# Patient Record
Sex: MALE | Race: WHITE | URBAN - METROPOLITAN AREA
[De-identification: names, ages, dates, MRNs, and addresses within clinical notes are randomized per-mention and may not be internally consistent; named-entity substitution may affect disease eponyms.]

---

## 2017-10-18 ENCOUNTER — OUTPATIENT (OUTPATIENT)
Dept: OUTPATIENT SERVICES | Facility: HOSPITAL | Age: 67
LOS: 1 days | Discharge: HOME | End: 2017-10-18
Payer: MEDICARE

## 2017-10-18 VITALS
OXYGEN SATURATION: 98 % | HEIGHT: 75 IN | TEMPERATURE: 98 F | RESPIRATION RATE: 18 BRPM | DIASTOLIC BLOOD PRESSURE: 67 MMHG | SYSTOLIC BLOOD PRESSURE: 120 MMHG | HEART RATE: 62 BPM | WEIGHT: 315 LBS

## 2017-10-18 DIAGNOSIS — M17.11 UNILATERAL PRIMARY OSTEOARTHRITIS, RIGHT KNEE: ICD-10-CM

## 2017-10-18 DIAGNOSIS — Z90.89 ACQUIRED ABSENCE OF OTHER ORGANS: Chronic | ICD-10-CM

## 2017-10-18 DIAGNOSIS — Z98.890 OTHER SPECIFIED POSTPROCEDURAL STATES: Chronic | ICD-10-CM

## 2017-10-18 LAB
ABO RH CONFIRMATION: SIGNIFICANT CHANGE UP
ANION GAP SERPL CALC-SCNC: 5 MMOL/L — SIGNIFICANT CHANGE UP (ref 5–17)
APPEARANCE UR: CLEAR — SIGNIFICANT CHANGE UP
BASOPHILS # BLD AUTO: 0.2 K/UL — SIGNIFICANT CHANGE UP (ref 0–0.2)
BASOPHILS NFR BLD AUTO: 1.2 % — SIGNIFICANT CHANGE UP (ref 0–2)
BILIRUB UR-MCNC: NEGATIVE — SIGNIFICANT CHANGE UP
BLD GP AB SCN SERPL QL: SIGNIFICANT CHANGE UP
BUN SERPL-MCNC: 17 MG/DL — SIGNIFICANT CHANGE UP (ref 7–23)
CALCIUM SERPL-MCNC: 8.8 MG/DL — SIGNIFICANT CHANGE UP (ref 8.5–10.1)
CHLORIDE SERPL-SCNC: 102 MMOL/L — SIGNIFICANT CHANGE UP (ref 96–108)
CO2 SERPL-SCNC: 29 MMOL/L — SIGNIFICANT CHANGE UP (ref 22–31)
COLOR SPEC: YELLOW — SIGNIFICANT CHANGE UP
CREAT SERPL-MCNC: 1 MG/DL — SIGNIFICANT CHANGE UP (ref 0.5–1.3)
DIFF PNL FLD: (no result)
EOSINOPHIL # BLD AUTO: 0.5 K/UL — SIGNIFICANT CHANGE UP (ref 0–0.5)
EOSINOPHIL NFR BLD AUTO: 3.6 % — SIGNIFICANT CHANGE UP (ref 0–6)
EPI CELLS # UR: SIGNIFICANT CHANGE UP
GLUCOSE SERPL-MCNC: 87 MG/DL — SIGNIFICANT CHANGE UP (ref 70–99)
GLUCOSE UR QL: NEGATIVE MG/DL — SIGNIFICANT CHANGE UP
HCT VFR BLD CALC: 44.1 % — SIGNIFICANT CHANGE UP (ref 39–50)
HGB BLD-MCNC: 15.5 G/DL — SIGNIFICANT CHANGE UP (ref 13–17)
KETONES UR-MCNC: NEGATIVE — SIGNIFICANT CHANGE UP
LEUKOCYTE ESTERASE UR-ACNC: NEGATIVE — SIGNIFICANT CHANGE UP
LYMPHOCYTES # BLD AUTO: 15.4 % — SIGNIFICANT CHANGE UP (ref 13–44)
LYMPHOCYTES # BLD AUTO: 2.1 K/UL — SIGNIFICANT CHANGE UP (ref 1–3.3)
MCHC RBC-ENTMCNC: 31.1 PG — SIGNIFICANT CHANGE UP (ref 27–34)
MCHC RBC-ENTMCNC: 35.2 GM/DL — SIGNIFICANT CHANGE UP (ref 32–36)
MCV RBC AUTO: 88.2 FL — SIGNIFICANT CHANGE UP (ref 80–100)
MONOCYTES # BLD AUTO: 1.3 K/UL — HIGH (ref 0–0.9)
MONOCYTES NFR BLD AUTO: 9.1 % — SIGNIFICANT CHANGE UP (ref 2–14)
MRSA PCR RESULT.: SIGNIFICANT CHANGE UP
NEUTROPHILS # BLD AUTO: 9.8 K/UL — HIGH (ref 1.8–7.4)
NEUTROPHILS NFR BLD AUTO: 70.8 % — SIGNIFICANT CHANGE UP (ref 43–77)
NITRITE UR-MCNC: NEGATIVE — SIGNIFICANT CHANGE UP
PH UR: 6.5 — SIGNIFICANT CHANGE UP (ref 5–8)
PLATELET # BLD AUTO: 294 K/UL — SIGNIFICANT CHANGE UP (ref 150–400)
POTASSIUM SERPL-MCNC: 4.2 MMOL/L — SIGNIFICANT CHANGE UP (ref 3.5–5.3)
POTASSIUM SERPL-SCNC: 4.2 MMOL/L — SIGNIFICANT CHANGE UP (ref 3.5–5.3)
PROT UR-MCNC: NEGATIVE MG/DL — SIGNIFICANT CHANGE UP
RBC # BLD: 5 M/UL — SIGNIFICANT CHANGE UP (ref 4.2–5.8)
RBC # FLD: 11.6 % — SIGNIFICANT CHANGE UP (ref 10.3–14.5)
RBC CASTS # UR COMP ASSIST: SIGNIFICANT CHANGE UP /HPF (ref 0–4)
S AUREUS DNA NOSE QL NAA+PROBE: SIGNIFICANT CHANGE UP
SODIUM SERPL-SCNC: 136 MMOL/L — SIGNIFICANT CHANGE UP (ref 135–145)
SP GR SPEC: 1.01 — SIGNIFICANT CHANGE UP (ref 1.01–1.02)
TYPE + AB SCN PNL BLD: SIGNIFICANT CHANGE UP
UROBILINOGEN FLD QL: NEGATIVE MG/DL — SIGNIFICANT CHANGE UP
WBC # BLD: 13.9 K/UL — HIGH (ref 3.8–10.5)
WBC # FLD AUTO: 13.9 K/UL — HIGH (ref 3.8–10.5)
WBC UR QL: NEGATIVE — SIGNIFICANT CHANGE UP

## 2017-10-18 PROCEDURE — 71020: CPT | Mod: 26

## 2017-10-18 PROCEDURE — 73562 X-RAY EXAM OF KNEE 3: CPT | Mod: 26,RT

## 2017-10-18 NOTE — H&P PST ADULT - TEACHING/LEARNING LEARNING PREFERENCES
skill demonstration/video/written material/individual instruction/audio/group instruction/verbal instruction

## 2017-10-18 NOTE — H&P PST ADULT - HISTORY OF PRESENT ILLNESS
67 years old male with Osteoarthritis of right knee.  Admits to pain with weight bearing to right knee "for a couple of years. Swelling at times to right knee. Admits to falling  10/13/2017. Planned knee replacement.

## 2017-10-18 NOTE — H&P PST ADULT - ASSESSMENT
67 years old male present to PST prior to Right Knee Replacement with Dr. Baldemar Ortiz III. Caprini Risk Model score of 9.  Plan   1. NPO after midnight  2. Take the following medications with sips of water on the day of procedure: Metoprolol  3. Use E-Z sponge as directed  4. Use Mupirocin as directed.  5. Drink a quart of extra  fluids the day before your surgery.  6 Medical clearance with Dr. Mauricio Mock and cardiac clearance with Dr. Ya.  7. CBC, BMP, Urinalysis, Type and Screen, MRSA sent to lab  8. EKG, right knee x-ray and chest x-ray done  9. PT/PTT and INR on the day od surgery

## 2017-10-18 NOTE — H&P PST ADULT - FAMILY HISTORY
Father  Still living? No  Family history of lung cancer, Age at diagnosis: Age Unknown     Mother  Still living? No  Family history of bone cancer, Age at diagnosis: Age Unknown
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
## 2017-10-25 ENCOUNTER — OUTPATIENT (OUTPATIENT)
Dept: OUTPATIENT SERVICES | Facility: HOSPITAL | Age: 67
LOS: 1 days | Discharge: HOME | End: 2017-10-25
Payer: MEDICARE

## 2017-10-25 DIAGNOSIS — I10 ESSENTIAL (PRIMARY) HYPERTENSION: ICD-10-CM

## 2017-10-25 DIAGNOSIS — Z01.818 ENCOUNTER FOR OTHER PREPROCEDURAL EXAMINATION: ICD-10-CM

## 2017-10-25 DIAGNOSIS — E78.5 HYPERLIPIDEMIA, UNSPECIFIED: ICD-10-CM

## 2017-10-25 DIAGNOSIS — E66.9 OBESITY, UNSPECIFIED: ICD-10-CM

## 2017-10-25 DIAGNOSIS — M17.11 UNILATERAL PRIMARY OSTEOARTHRITIS, RIGHT KNEE: ICD-10-CM

## 2017-10-25 DIAGNOSIS — Z98.890 OTHER SPECIFIED POSTPROCEDURAL STATES: Chronic | ICD-10-CM

## 2017-10-25 DIAGNOSIS — Z90.89 ACQUIRED ABSENCE OF OTHER ORGANS: Chronic | ICD-10-CM

## 2017-10-25 PROCEDURE — 93010 ELECTROCARDIOGRAM REPORT: CPT

## 2017-10-30 RX ORDER — OXYCODONE HYDROCHLORIDE 5 MG/1
10 TABLET ORAL EVERY 6 HOURS
Qty: 0 | Refills: 0 | Status: DISCONTINUED | OUTPATIENT
Start: 2017-10-31 | End: 2017-11-02

## 2017-10-30 RX ORDER — CELECOXIB 200 MG/1
200 CAPSULE ORAL
Qty: 0 | Refills: 0 | Status: DISCONTINUED | OUTPATIENT
Start: 2017-10-31 | End: 2017-11-02

## 2017-10-30 RX ORDER — SENNA PLUS 8.6 MG/1
2 TABLET ORAL AT BEDTIME
Qty: 0 | Refills: 0 | Status: DISCONTINUED | OUTPATIENT
Start: 2017-10-31 | End: 2017-11-02

## 2017-10-30 RX ORDER — PANTOPRAZOLE SODIUM 20 MG/1
40 TABLET, DELAYED RELEASE ORAL ONCE
Qty: 0 | Refills: 0 | Status: COMPLETED | OUTPATIENT
Start: 2017-10-31 | End: 2017-10-31

## 2017-10-30 RX ORDER — SODIUM CHLORIDE 9 MG/ML
3 INJECTION INTRAMUSCULAR; INTRAVENOUS; SUBCUTANEOUS EVERY 8 HOURS
Qty: 0 | Refills: 0 | Status: DISCONTINUED | OUTPATIENT
Start: 2017-10-31 | End: 2017-10-31

## 2017-10-30 RX ORDER — ONDANSETRON 8 MG/1
4 TABLET, FILM COATED ORAL EVERY 6 HOURS
Qty: 0 | Refills: 0 | Status: DISCONTINUED | OUTPATIENT
Start: 2017-10-31 | End: 2017-11-02

## 2017-10-30 RX ORDER — OXYCODONE HYDROCHLORIDE 5 MG/1
20 TABLET ORAL ONCE
Qty: 0 | Refills: 0 | Status: DISCONTINUED | OUTPATIENT
Start: 2017-10-31 | End: 2017-10-31

## 2017-10-30 RX ORDER — FAMOTIDINE 10 MG/ML
20 INJECTION INTRAVENOUS ONCE
Qty: 0 | Refills: 0 | Status: COMPLETED | OUTPATIENT
Start: 2017-10-31 | End: 2017-10-31

## 2017-10-30 RX ORDER — ACETAMINOPHEN 500 MG
650 TABLET ORAL EVERY 6 HOURS
Qty: 0 | Refills: 0 | Status: DISCONTINUED | OUTPATIENT
Start: 2017-10-31 | End: 2017-11-02

## 2017-10-30 RX ORDER — OXYCODONE HYDROCHLORIDE 5 MG/1
10 TABLET ORAL EVERY 12 HOURS
Qty: 0 | Refills: 0 | Status: DISCONTINUED | OUTPATIENT
Start: 2017-10-31 | End: 2017-11-02

## 2017-10-30 RX ORDER — ACETAMINOPHEN 500 MG
650 TABLET ORAL ONCE
Qty: 0 | Refills: 0 | Status: COMPLETED | OUTPATIENT
Start: 2017-10-31 | End: 2017-10-31

## 2017-10-30 RX ORDER — OXYCODONE HYDROCHLORIDE 5 MG/1
5 TABLET ORAL EVERY 6 HOURS
Qty: 0 | Refills: 0 | Status: DISCONTINUED | OUTPATIENT
Start: 2017-10-31 | End: 2017-11-02

## 2017-10-30 RX ORDER — HYDROMORPHONE HYDROCHLORIDE 2 MG/ML
0.5 INJECTION INTRAMUSCULAR; INTRAVENOUS; SUBCUTANEOUS
Qty: 0 | Refills: 0 | Status: DISCONTINUED | OUTPATIENT
Start: 2017-10-31 | End: 2017-11-02

## 2017-10-31 ENCOUNTER — RESULT REVIEW (OUTPATIENT)
Age: 67
End: 2017-10-31

## 2017-10-31 ENCOUNTER — INPATIENT (INPATIENT)
Facility: HOSPITAL | Age: 67
LOS: 1 days | Discharge: HOME HEALTH | End: 2017-11-02
Attending: ORTHOPAEDIC SURGERY | Admitting: ORTHOPAEDIC SURGERY
Payer: MEDICARE

## 2017-10-31 ENCOUNTER — TRANSCRIPTION ENCOUNTER (OUTPATIENT)
Age: 67
End: 2017-10-31

## 2017-10-31 VITALS
HEIGHT: 75 IN | HEART RATE: 80 BPM | DIASTOLIC BLOOD PRESSURE: 70 MMHG | OXYGEN SATURATION: 98 % | RESPIRATION RATE: 18 BRPM | WEIGHT: 315 LBS | TEMPERATURE: 98 F | SYSTOLIC BLOOD PRESSURE: 135 MMHG

## 2017-10-31 DIAGNOSIS — Z98.890 OTHER SPECIFIED POSTPROCEDURAL STATES: Chronic | ICD-10-CM

## 2017-10-31 DIAGNOSIS — Z90.89 ACQUIRED ABSENCE OF OTHER ORGANS: Chronic | ICD-10-CM

## 2017-10-31 LAB
ANION GAP SERPL CALC-SCNC: 7 MMOL/L — SIGNIFICANT CHANGE UP (ref 5–17)
APTT BLD: 31.6 SEC — SIGNIFICANT CHANGE UP (ref 27.5–37.4)
BUN SERPL-MCNC: 17 MG/DL — SIGNIFICANT CHANGE UP (ref 7–23)
CALCIUM SERPL-MCNC: 8.5 MG/DL — SIGNIFICANT CHANGE UP (ref 8.5–10.1)
CHLORIDE SERPL-SCNC: 105 MMOL/L — SIGNIFICANT CHANGE UP (ref 96–108)
CO2 SERPL-SCNC: 26 MMOL/L — SIGNIFICANT CHANGE UP (ref 22–31)
CREAT SERPL-MCNC: 1.03 MG/DL — SIGNIFICANT CHANGE UP (ref 0.5–1.3)
GLUCOSE SERPL-MCNC: 121 MG/DL — HIGH (ref 70–99)
HCT VFR BLD CALC: 39 % — SIGNIFICANT CHANGE UP (ref 39–50)
HGB BLD-MCNC: 13.9 G/DL — SIGNIFICANT CHANGE UP (ref 13–17)
INR BLD: 1 RATIO — SIGNIFICANT CHANGE UP (ref 0.88–1.16)
MCHC RBC-ENTMCNC: 31.8 PG — SIGNIFICANT CHANGE UP (ref 27–34)
MCHC RBC-ENTMCNC: 35.7 GM/DL — SIGNIFICANT CHANGE UP (ref 32–36)
MCV RBC AUTO: 89.2 FL — SIGNIFICANT CHANGE UP (ref 80–100)
PLATELET # BLD AUTO: 252 K/UL — SIGNIFICANT CHANGE UP (ref 150–400)
POTASSIUM SERPL-MCNC: 4.5 MMOL/L — SIGNIFICANT CHANGE UP (ref 3.5–5.3)
POTASSIUM SERPL-SCNC: 4.5 MMOL/L — SIGNIFICANT CHANGE UP (ref 3.5–5.3)
PROTHROM AB SERPL-ACNC: 10.8 SEC — SIGNIFICANT CHANGE UP (ref 9.8–12.7)
RBC # BLD: 4.37 M/UL — SIGNIFICANT CHANGE UP (ref 4.2–5.8)
RBC # FLD: 11.8 % — SIGNIFICANT CHANGE UP (ref 10.3–14.5)
SODIUM SERPL-SCNC: 138 MMOL/L — SIGNIFICANT CHANGE UP (ref 135–145)
WBC # BLD: 14.1 K/UL — HIGH (ref 3.8–10.5)
WBC # FLD AUTO: 14.1 K/UL — HIGH (ref 3.8–10.5)

## 2017-10-31 PROCEDURE — 73560 X-RAY EXAM OF KNEE 1 OR 2: CPT | Mod: 26,RT

## 2017-10-31 PROCEDURE — 88305 TISSUE EXAM BY PATHOLOGIST: CPT | Mod: 26

## 2017-10-31 PROCEDURE — 99223 1ST HOSP IP/OBS HIGH 75: CPT

## 2017-10-31 RX ORDER — CEFAZOLIN SODIUM 1 G
3000 VIAL (EA) INJECTION EVERY 8 HOURS
Qty: 0 | Refills: 0 | Status: COMPLETED | OUTPATIENT
Start: 2017-10-31 | End: 2017-11-01

## 2017-10-31 RX ORDER — POLYETHYLENE GLYCOL 3350 17 G/17G
17 POWDER, FOR SOLUTION ORAL DAILY
Qty: 0 | Refills: 0 | Status: DISCONTINUED | OUTPATIENT
Start: 2017-10-31 | End: 2017-11-02

## 2017-10-31 RX ORDER — DOCUSATE SODIUM 100 MG
100 CAPSULE ORAL THREE TIMES A DAY
Qty: 0 | Refills: 0 | Status: DISCONTINUED | OUTPATIENT
Start: 2017-10-31 | End: 2017-11-02

## 2017-10-31 RX ORDER — LISINOPRIL 2.5 MG/1
20 TABLET ORAL DAILY
Qty: 0 | Refills: 0 | Status: DISCONTINUED | OUTPATIENT
Start: 2017-10-31 | End: 2017-11-02

## 2017-10-31 RX ORDER — MAGNESIUM HYDROXIDE 400 MG/1
30 TABLET, CHEWABLE ORAL DAILY
Qty: 0 | Refills: 0 | Status: DISCONTINUED | OUTPATIENT
Start: 2017-10-31 | End: 2017-11-02

## 2017-10-31 RX ORDER — HYDROCHLOROTHIAZIDE 25 MG
25 TABLET ORAL AT BEDTIME
Qty: 0 | Refills: 0 | Status: DISCONTINUED | OUTPATIENT
Start: 2017-10-31 | End: 2017-11-02

## 2017-10-31 RX ORDER — ATORVASTATIN CALCIUM 80 MG/1
40 TABLET, FILM COATED ORAL AT BEDTIME
Qty: 0 | Refills: 0 | Status: DISCONTINUED | OUTPATIENT
Start: 2017-10-31 | End: 2017-11-02

## 2017-10-31 RX ORDER — INFLUENZA VIRUS VACCINE 15; 15; 15; 15 UG/.5ML; UG/.5ML; UG/.5ML; UG/.5ML
0.5 SUSPENSION INTRAMUSCULAR ONCE
Qty: 0 | Refills: 0 | Status: COMPLETED | OUTPATIENT
Start: 2017-10-31 | End: 2017-10-31

## 2017-10-31 RX ORDER — HEPARIN SODIUM 5000 [USP'U]/ML
5000 INJECTION INTRAVENOUS; SUBCUTANEOUS EVERY 8 HOURS
Qty: 0 | Refills: 0 | Status: DISCONTINUED | OUTPATIENT
Start: 2017-10-31 | End: 2017-11-02

## 2017-10-31 RX ORDER — FERROUS SULFATE 325(65) MG
325 TABLET ORAL
Qty: 0 | Refills: 0 | Status: DISCONTINUED | OUTPATIENT
Start: 2017-10-31 | End: 2017-11-02

## 2017-10-31 RX ORDER — SODIUM CHLORIDE 9 MG/ML
1000 INJECTION, SOLUTION INTRAVENOUS
Qty: 0 | Refills: 0 | Status: DISCONTINUED | OUTPATIENT
Start: 2017-10-31 | End: 2017-11-02

## 2017-10-31 RX ORDER — ASCORBIC ACID 60 MG
500 TABLET,CHEWABLE ORAL
Qty: 0 | Refills: 0 | Status: DISCONTINUED | OUTPATIENT
Start: 2017-10-31 | End: 2017-11-02

## 2017-10-31 RX ORDER — FOLIC ACID 0.8 MG
1 TABLET ORAL DAILY
Qty: 0 | Refills: 0 | Status: DISCONTINUED | OUTPATIENT
Start: 2017-10-31 | End: 2017-11-02

## 2017-10-31 RX ORDER — OXYCODONE HYDROCHLORIDE 5 MG/1
5 TABLET ORAL EVERY 4 HOURS
Qty: 0 | Refills: 0 | Status: DISCONTINUED | OUTPATIENT
Start: 2017-10-31 | End: 2017-10-31

## 2017-10-31 RX ORDER — SENNA PLUS 8.6 MG/1
2 TABLET ORAL AT BEDTIME
Qty: 0 | Refills: 0 | Status: DISCONTINUED | OUTPATIENT
Start: 2017-10-31 | End: 2017-11-02

## 2017-10-31 RX ORDER — ACETAMINOPHEN 500 MG
650 TABLET ORAL EVERY 6 HOURS
Qty: 0 | Refills: 0 | Status: DISCONTINUED | OUTPATIENT
Start: 2017-10-31 | End: 2017-11-02

## 2017-10-31 RX ORDER — PANTOPRAZOLE SODIUM 20 MG/1
40 TABLET, DELAYED RELEASE ORAL DAILY
Qty: 0 | Refills: 0 | Status: DISCONTINUED | OUTPATIENT
Start: 2017-10-31 | End: 2017-11-02

## 2017-10-31 RX ORDER — ONDANSETRON 8 MG/1
4 TABLET, FILM COATED ORAL EVERY 6 HOURS
Qty: 0 | Refills: 0 | Status: DISCONTINUED | OUTPATIENT
Start: 2017-10-31 | End: 2017-10-31

## 2017-10-31 RX ORDER — METOPROLOL TARTRATE 50 MG
50 TABLET ORAL AT BEDTIME
Qty: 0 | Refills: 0 | Status: DISCONTINUED | OUTPATIENT
Start: 2017-10-31 | End: 2017-11-02

## 2017-10-31 RX ORDER — WARFARIN SODIUM 2.5 MG/1
5 TABLET ORAL DAILY
Qty: 0 | Refills: 0 | Status: DISCONTINUED | OUTPATIENT
Start: 2017-10-31 | End: 2017-11-01

## 2017-10-31 RX ORDER — SODIUM CHLORIDE 9 MG/ML
1000 INJECTION, SOLUTION INTRAVENOUS
Qty: 0 | Refills: 0 | Status: DISCONTINUED | OUTPATIENT
Start: 2017-10-31 | End: 2017-10-31

## 2017-10-31 RX ORDER — DOCUSATE SODIUM 100 MG
100 CAPSULE ORAL EVERY 12 HOURS
Qty: 0 | Refills: 0 | Status: DISCONTINUED | OUTPATIENT
Start: 2017-10-31 | End: 2017-10-31

## 2017-10-31 RX ORDER — OXYCODONE HYDROCHLORIDE 5 MG/1
10 TABLET ORAL EVERY 4 HOURS
Qty: 0 | Refills: 0 | Status: DISCONTINUED | OUTPATIENT
Start: 2017-10-31 | End: 2017-10-31

## 2017-10-31 RX ORDER — HYDROMORPHONE HYDROCHLORIDE 2 MG/ML
0.5 INJECTION INTRAMUSCULAR; INTRAVENOUS; SUBCUTANEOUS
Qty: 0 | Refills: 0 | Status: DISCONTINUED | OUTPATIENT
Start: 2017-10-31 | End: 2017-10-31

## 2017-10-31 RX ORDER — CELECOXIB 200 MG/1
200 CAPSULE ORAL ONCE
Qty: 0 | Refills: 0 | Status: COMPLETED | OUTPATIENT
Start: 2017-10-31 | End: 2017-10-31

## 2017-10-31 RX ADMIN — Medication 25 MILLIGRAM(S): at 21:59

## 2017-10-31 RX ADMIN — FAMOTIDINE 20 MILLIGRAM(S): 10 INJECTION INTRAVENOUS at 07:45

## 2017-10-31 RX ADMIN — Medication 50 MILLIGRAM(S): at 21:59

## 2017-10-31 RX ADMIN — Medication 200 MILLIGRAM(S): at 18:44

## 2017-10-31 RX ADMIN — Medication 650 MILLIGRAM(S): at 19:00

## 2017-10-31 RX ADMIN — LISINOPRIL 20 MILLIGRAM(S): 2.5 TABLET ORAL at 18:06

## 2017-10-31 RX ADMIN — CELECOXIB 200 MILLIGRAM(S): 200 CAPSULE ORAL at 07:47

## 2017-10-31 RX ADMIN — SENNA PLUS 2 TABLET(S): 8.6 TABLET ORAL at 21:59

## 2017-10-31 RX ADMIN — SODIUM CHLORIDE 100 MILLILITER(S): 9 INJECTION, SOLUTION INTRAVENOUS at 18:44

## 2017-10-31 RX ADMIN — Medication 650 MILLIGRAM(S): at 07:44

## 2017-10-31 RX ADMIN — Medication 100 MILLIGRAM(S): at 21:59

## 2017-10-31 RX ADMIN — ATORVASTATIN CALCIUM 40 MILLIGRAM(S): 80 TABLET, FILM COATED ORAL at 21:59

## 2017-10-31 RX ADMIN — HEPARIN SODIUM 5000 UNIT(S): 5000 INJECTION INTRAVENOUS; SUBCUTANEOUS at 21:59

## 2017-10-31 RX ADMIN — Medication 1 TABLET(S): at 21:59

## 2017-10-31 RX ADMIN — PANTOPRAZOLE SODIUM 40 MILLIGRAM(S): 20 TABLET, DELAYED RELEASE ORAL at 07:45

## 2017-10-31 RX ADMIN — Medication 325 MILLIGRAM(S): at 18:05

## 2017-10-31 RX ADMIN — WARFARIN SODIUM 5 MILLIGRAM(S): 2.5 TABLET ORAL at 21:59

## 2017-10-31 RX ADMIN — Medication 650 MILLIGRAM(S): at 18:06

## 2017-10-31 RX ADMIN — OXYCODONE HYDROCHLORIDE 20 MILLIGRAM(S): 5 TABLET ORAL at 07:43

## 2017-10-31 RX ADMIN — OXYCODONE HYDROCHLORIDE 10 MILLIGRAM(S): 5 TABLET ORAL at 18:05

## 2017-10-31 RX ADMIN — OXYCODONE HYDROCHLORIDE 10 MILLIGRAM(S): 5 TABLET ORAL at 19:00

## 2017-10-31 RX ADMIN — Medication 500 MILLIGRAM(S): at 18:05

## 2017-10-31 NOTE — CONSULT NOTE ADULT - ASSESSMENT
This is a 67 year old male with a past medical history of HTN/HLD, AAA without rupture, obesity, right knee osteoarthritis with worsening pain and swelling who underwent right total knee arthroplasty on 10/31/17 with Dr. Ortiz.     10/31/17: Pt seen at bedside, getting ready for physical therapy. He is tolerating well, denies pain. Explained to patient about warfarin and bridging with heparin for therapeutic INR 2-3. He understands and is accepting therapy.     PLAN: This is a 67 year old male with a past medical history of HTN/HLD, AAA without rupture, obesity, right knee osteoarthritis with worsening pain and swelling who underwent right total knee arthroplasty on 10/31/17 with Dr. Ortiz. Patient also has history or hemorrhoids, reports last episode about 1 month ago, and prior to that 1 year ago. He states had done colonoscopy last year and was reported normal. Discussed the necessity of VTE prophylaxis with coumadin. Educated patient on INR, value, meaning, and effects medications and foods may have on it. Will further reinforce coumadin education and follow up on outpatient basis.     Plan:  Coumadin 5 mg PO daily x 4 weeks total adjust dose per INR  Heparin 5,000 units SQ Q8hour  Recommend discontinuing celecoxib 200 mg if pain is under controlled   Daily PT/INR, CBC/BMP  Encourage ambulation  Maintain venodynes    Thank you for this consult, will continue to follow. This is a 67 year old male with a past medical history of HTN/HLD, AAA without rupture, obesity, right knee osteoarthritis with worsening pain and swelling who underwent right total knee arthroplasty on 10/31/17 with Dr. Ortiz. Patient also has history or hemorrhoids, reports last episode about 1 month ago, and prior to that 1 year ago. He states had done colonoscopy last year and was reported normal. Discussed the necessity of VTE prophylaxis with coumadin. Educated patient on INR, value, meaning, and effects medications and foods may have on it. Will further reinforce coumadin education and follow up on outpatient basis.     Plan:  Coumadin 5 mg PO daily x 4 weeks total adjust dose per INR  Heparin 5,000 units SQ Q8hour  Recommend discontinuing celecoxib 200 mg if pain is under controlled   Daily PT/INR, CBC/BMP  Encourage ambulation  Maintain venodynes    Thank you for this consult, will continue to follow.    agree with above plan.   Sheri Vargas NP
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
## 2017-10-31 NOTE — BRIEF OPERATIVE NOTE - PROCEDURE
<<-----Click on this checkbox to enter Procedure Total knee arthroplasty  10/31/2017  right  Active  [id number]

## 2017-10-31 NOTE — DISCHARGE NOTE ADULT - CONDITIONS AT DISCHARGE
Monitor for signs of infection such as new onset pain, reddness, swelling, smell, or drainage at the incisional site. or a temperature > 101 degrees F.

## 2017-10-31 NOTE — DISCHARGE NOTE ADULT - CARE PLAN
Principal Discharge DX:	S/P total knee arthroplasty, right  Goal:	Return to baseline ADLs  Instructions for follow-up, activity and diet:	Discharge Instructions for Total Knee Arthroplasty    1.  Diet: Resume previous diet    2. Activity: WBAT, Rolling walker, Daily PT. Gentle ROM 0-full as tolerated. Walk plenty.    3. Call with: fever over 101, wound redness, drainage or open area, calf pain/calf swelling    4. Wound Care: Remove old and place new Aquacel  bandage to Knee every 7 days. Remove Sutures/Staples Post Op Day #14 (11/14/17) so long as wound is healed, no drainage or open area. OK to Shower with Aquacel.  Avoid direct water beating on bandage.  Continue ICE packs to knee.    5. DVT PE Prophylaxis:  Daily Coumadin per INR goal 2-3. Stop Heparin when INR>2  as per Anticoagulation Instructions. Check INR daily until levels stable.    6. Labs: Check H&H weekly while on Anticoagulation    7. Follow Up: Orthopedics, Dr. Ortiz, in 10-14 days in office. Call to schedule. If going home, eRX sent to your pharmacy for . Principal Discharge DX:	S/P total knee arthroplasty, right  Goal:	Return to baseline ADLs  Instructions for follow-up, activity and diet:	Discharge Instructions for Right Total Knee Arthroplasty    1.  Diet: Resume previous diet  2. Activity: WBAT, Rolling walker, Daily PT. Gentle ROM 0-full as tolerated. Walk plenty.  Roll towel under heel while in bed or asleep.  3. Call with: fever over 101, wound redness, drainage or open area, calf pain/calf swelling  4. Wound Care: Remove old and place new Aquacel  bandage to Knee every 7 days. Remove Staples Post Op Day #14 (11/14/17) so long as wound is healed, no drainage or open area. OK to Shower with Aquacel; Must be wearing Aquacel bandage to shower.  Avoid direct water beating on bandage.  Continue ICE packs to knee.  5. DVT PE Prophylaxis:  Daily Coumadin per INR goal 2-3. Stop Lovenox when INR>1.8  as per Anticoagulation Instructions. Check INR daily until levels stable.  6. Check INR while on Coumadin.  7. Continue Protonix daily while on Anticoagulant (Coumadin, Lovenox). An eRx has been sent to your pharmacy.  8. Labs: Check H&H weekly while on Anticoagulation.   9. Follow Up: Orthopedics, 10-14 days in office. Call to schedule.  10. Pain medications:  An eRX has been sent to your pharmacy for .

## 2017-10-31 NOTE — PHYSICAL THERAPY INITIAL EVALUATION ADULT - MODALITIES TREATMENT COMMENTS
The pt was returned to supine and was adjusted for comfort in NAD. The pt was left in the care of the nursing assistant.

## 2017-10-31 NOTE — DISCHARGE NOTE ADULT - MEDICATION SUMMARY - MEDICATIONS TO TAKE
I will START or STAY ON the medications listed below when I get home from the hospital:    aspirin 81 mg oral tablet  -- 1 tab(s) by mouth once a day (in the morning)  -- Indication: For home med    Percocet 5/325 oral tablet  -- 1 tab(s) by mouth every 4 hours as needed for severe pain x 7 days MDD:6  -- Caution federal law prohibits the transfer of this drug to any person other  than the person for whom it was prescribed.  May cause drowsiness.  Alcohol may intensify this effect.  Use care when operating dangerous machinery.  This prescription cannot be refilled.  This product contains acetaminophen.  Do not use  with any other product containing acetaminophen to prevent possible liver damage.  Using more of this medication than prescribed may cause serious breathing problems.    -- Indication: For Severe pain    warfarin 5 mg oral tablet  -- 1 tab(s) by mouth once a day   -- Indication: For blood clot prevention    Lovenox 40 mg/0.4 mL injectable solution  -- 40 milligram(s) injectable every 12 hours     please dispense 10 syringes of lovenox 40 mg/.4 ml   -- It is very important that you take or use this exactly as directed.  Do not skip doses or discontinue unless directed by your doctor.    -- Indication: For blood clot prevention    rosuvastatin 10 mg oral tablet  -- 1 tab(s) by mouth once a day (at bedtime)  -- Indication: For home med    lisinopril-hydroCHLOROthiazide 20 mg-25 mg oral tablet  -- 1 tab(s) by mouth once a day (in the morning)  -- Indication: For home med    Metoprolol Tartrate 50 mg oral tablet  -- 1 tab(s) by mouth once a day (at bedtime)  -- Indication: For home med    Colace 100 mg oral capsule  -- 1 cap(s) by mouth 2 times a day while taking Percocet  -- Medication should be taken with plenty of water.    -- Indication: For Stool softener    Protonix 40 mg oral delayed release tablet  -- 1 tab(s) by mouth once a day while on blood clot prevention medications  -- It is very important that you take or use this exactly as directed.  Do not skip doses or discontinue unless directed by your doctor.  Obtain medical advice before taking any non-prescription drugs as some may affect the action of this medication.  Swallow whole.  Do not crush.    -- Indication: For Stomach protection    Multiple Vitamins oral capsule  -- 1 cap(s) by mouth once a day  -- Indication: For vitamin    Vitamin C 500 mg oral tablet  -- 1 tab(s) by mouth once a day  -- Indication: For home med    Vitamin D3 1000 intl units oral capsule  -- 1 cap(s) by mouth once a day  -- Indication: For home med
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
## 2017-10-31 NOTE — PHYSICAL THERAPY INITIAL EVALUATION ADULT - GENERAL OBSERVATIONS, REHAB EVAL
The pt was pleasant and cooperative, supine, with hemovac present. The pt was very eager to get OOB with PT.

## 2017-10-31 NOTE — PHYSICAL THERAPY INITIAL EVALUATION ADULT - LIVES WITH, PROFILE
alone/pt reports that for the next week or 2 h wont be alone, he will have someone present to help him.

## 2017-10-31 NOTE — DISCHARGE NOTE ADULT - PATIENT PORTAL LINK FT
“You can access the [facility] Patient Portal, offered by [facility], by registering with the following website: [email or web address]”

## 2017-10-31 NOTE — PATIENT PROFILE ADULT. - TEACHING/LEARNING LEARNING PREFERENCES
written material/skill demonstration/verbal instruction/video/individual instruction/audio/group instruction

## 2017-10-31 NOTE — DISCHARGE NOTE ADULT - PLAN OF CARE
Return to baseline ADLs Discharge Instructions for Total Knee Arthroplasty    1.  Diet: Resume previous diet    2. Activity: WBAT, Rolling walker, Daily PT. Gentle ROM 0-full as tolerated. Walk plenty.    3. Call with: fever over 101, wound redness, drainage or open area, calf pain/calf swelling    4. Wound Care: Remove old and place new Aquacel  bandage to Knee every 7 days. Remove Sutures/Staples Post Op Day #14 (11/14/17) so long as wound is healed, no drainage or open area. OK to Shower with Aquacel.  Avoid direct water beating on bandage.  Continue ICE packs to knee.    5. DVT PE Prophylaxis:  Daily Coumadin per INR goal 2-3. Stop Heparin when INR>2  as per Anticoagulation Instructions. Check INR daily until levels stable.    6. Labs: Check H&H weekly while on Anticoagulation    7. Follow Up: Orthopedics, Dr. Ortiz, in 10-14 days in office. Call to schedule. If going home, eRX sent to your pharmacy for . Discharge Instructions for Right Total Knee Arthroplasty    1.  Diet: Resume previous diet  2. Activity: WBAT, Rolling walker, Daily PT. Gentle ROM 0-full as tolerated. Walk plenty.  Roll towel under heel while in bed or asleep.  3. Call with: fever over 101, wound redness, drainage or open area, calf pain/calf swelling  4. Wound Care: Remove old and place new Aquacel  bandage to Knee every 7 days. Remove Staples Post Op Day #14 (11/14/17) so long as wound is healed, no drainage or open area. OK to Shower with Aquacel; Must be wearing Aquacel bandage to shower.  Avoid direct water beating on bandage.  Continue ICE packs to knee.  5. DVT PE Prophylaxis:  Daily Coumadin per INR goal 2-3. Stop Lovenox when INR>1.8  as per Anticoagulation Instructions. Check INR daily until levels stable.  6. Check INR while on Coumadin.  7. Continue Protonix daily while on Anticoagulant (Coumadin, Lovenox). An eRx has been sent to your pharmacy.  8. Labs: Check H&H weekly while on Anticoagulation.   9. Follow Up: Orthopedics, 10-14 days in office. Call to schedule.  10. Pain medications:  An eRX has been sent to your pharmacy for .

## 2017-10-31 NOTE — DISCHARGE NOTE ADULT - CARE PROVIDER_API CALL
Baldemar Ortiz), Orthopaedic Surgery  [address] [place] [address]  Phone: [phone]  Fax: [phone]

## 2017-11-01 LAB
ANION GAP SERPL CALC-SCNC: 8 MMOL/L — SIGNIFICANT CHANGE UP (ref 5–17)
APTT BLD: 27.3 SEC — LOW (ref 27.5–37.4)
BUN SERPL-MCNC: 19 MG/DL — SIGNIFICANT CHANGE UP (ref 7–23)
CALCIUM SERPL-MCNC: 8.5 MG/DL — SIGNIFICANT CHANGE UP (ref 8.5–10.1)
CHLORIDE SERPL-SCNC: 101 MMOL/L — SIGNIFICANT CHANGE UP (ref 96–108)
CO2 SERPL-SCNC: 25 MMOL/L — SIGNIFICANT CHANGE UP (ref 22–31)
CREAT SERPL-MCNC: 0.93 MG/DL — SIGNIFICANT CHANGE UP (ref 0.5–1.3)
GLUCOSE SERPL-MCNC: 111 MG/DL — HIGH (ref 70–99)
HCT VFR BLD CALC: 35.8 % — LOW (ref 39–50)
HGB BLD-MCNC: 12.8 G/DL — LOW (ref 13–17)
INR BLD: 1.11 RATIO — SIGNIFICANT CHANGE UP (ref 0.88–1.16)
MCHC RBC-ENTMCNC: 31.4 PG — SIGNIFICANT CHANGE UP (ref 27–34)
MCHC RBC-ENTMCNC: 35.6 GM/DL — SIGNIFICANT CHANGE UP (ref 32–36)
MCV RBC AUTO: 88.1 FL — SIGNIFICANT CHANGE UP (ref 80–100)
PLATELET # BLD AUTO: 247 K/UL — SIGNIFICANT CHANGE UP (ref 150–400)
POTASSIUM SERPL-MCNC: 4 MMOL/L — SIGNIFICANT CHANGE UP (ref 3.5–5.3)
POTASSIUM SERPL-SCNC: 4 MMOL/L — SIGNIFICANT CHANGE UP (ref 3.5–5.3)
PROTHROM AB SERPL-ACNC: 12 SEC — SIGNIFICANT CHANGE UP (ref 9.8–12.7)
RBC # BLD: 4.06 M/UL — LOW (ref 4.2–5.8)
RBC # FLD: 11.4 % — SIGNIFICANT CHANGE UP (ref 10.3–14.5)
SODIUM SERPL-SCNC: 134 MMOL/L — LOW (ref 135–145)
WBC # BLD: 22.7 K/UL — HIGH (ref 3.8–10.5)
WBC # FLD AUTO: 22.7 K/UL — HIGH (ref 3.8–10.5)

## 2017-11-01 PROCEDURE — 99233 SBSQ HOSP IP/OBS HIGH 50: CPT

## 2017-11-01 RX ORDER — WARFARIN SODIUM 2.5 MG/1
1 TABLET ORAL
Qty: 30 | Refills: 0
Start: 2017-11-01 | End: 2017-12-01

## 2017-11-01 RX ORDER — PANTOPRAZOLE SODIUM 20 MG/1
1 TABLET, DELAYED RELEASE ORAL
Qty: 30 | Refills: 0
Start: 2017-11-01 | End: 2017-12-01

## 2017-11-01 RX ORDER — WARFARIN SODIUM 2.5 MG/1
7.5 TABLET ORAL DAILY
Qty: 0 | Refills: 0 | Status: DISCONTINUED | OUTPATIENT
Start: 2017-11-01 | End: 2017-11-02

## 2017-11-01 RX ORDER — DOCUSATE SODIUM 100 MG
1 CAPSULE ORAL
Qty: 14 | Refills: 0
Start: 2017-11-01 | End: 2017-11-08

## 2017-11-01 RX ORDER — CYCLOBENZAPRINE HYDROCHLORIDE 10 MG/1
5 TABLET, FILM COATED ORAL THREE TIMES A DAY
Qty: 0 | Refills: 0 | Status: DISCONTINUED | OUTPATIENT
Start: 2017-11-01 | End: 2017-11-02

## 2017-11-01 RX ORDER — ENOXAPARIN SODIUM 100 MG/ML
40 INJECTION SUBCUTANEOUS
Qty: 4 | Refills: 1
Start: 2017-11-01 | End: 2017-11-20

## 2017-11-01 RX ADMIN — Medication 1 TABLET(S): at 12:25

## 2017-11-01 RX ADMIN — WARFARIN SODIUM 7.5 MILLIGRAM(S): 2.5 TABLET ORAL at 22:17

## 2017-11-01 RX ADMIN — OXYCODONE HYDROCHLORIDE 10 MILLIGRAM(S): 5 TABLET ORAL at 06:28

## 2017-11-01 RX ADMIN — SENNA PLUS 2 TABLET(S): 8.6 TABLET ORAL at 22:17

## 2017-11-01 RX ADMIN — LISINOPRIL 20 MILLIGRAM(S): 2.5 TABLET ORAL at 06:40

## 2017-11-01 RX ADMIN — Medication 650 MILLIGRAM(S): at 12:25

## 2017-11-01 RX ADMIN — Medication 325 MILLIGRAM(S): at 18:24

## 2017-11-01 RX ADMIN — ATORVASTATIN CALCIUM 40 MILLIGRAM(S): 80 TABLET, FILM COATED ORAL at 22:17

## 2017-11-01 RX ADMIN — Medication 650 MILLIGRAM(S): at 18:24

## 2017-11-01 RX ADMIN — Medication 500 MILLIGRAM(S): at 06:24

## 2017-11-01 RX ADMIN — OXYCODONE HYDROCHLORIDE 10 MILLIGRAM(S): 5 TABLET ORAL at 18:23

## 2017-11-01 RX ADMIN — Medication 325 MILLIGRAM(S): at 08:34

## 2017-11-01 RX ADMIN — Medication 1 TABLET(S): at 13:42

## 2017-11-01 RX ADMIN — Medication 650 MILLIGRAM(S): at 06:27

## 2017-11-01 RX ADMIN — OXYCODONE HYDROCHLORIDE 10 MILLIGRAM(S): 5 TABLET ORAL at 02:04

## 2017-11-01 RX ADMIN — Medication 650 MILLIGRAM(S): at 06:24

## 2017-11-01 RX ADMIN — Medication 1 TABLET(S): at 22:17

## 2017-11-01 RX ADMIN — Medication 325 MILLIGRAM(S): at 12:26

## 2017-11-01 RX ADMIN — Medication 100 MILLIGRAM(S): at 13:40

## 2017-11-01 RX ADMIN — Medication 1 TABLET(S): at 06:24

## 2017-11-01 RX ADMIN — HEPARIN SODIUM 5000 UNIT(S): 5000 INJECTION INTRAVENOUS; SUBCUTANEOUS at 22:17

## 2017-11-01 RX ADMIN — Medication 100 MILLIGRAM(S): at 22:17

## 2017-11-01 RX ADMIN — Medication 50 MILLIGRAM(S): at 22:17

## 2017-11-01 RX ADMIN — PANTOPRAZOLE SODIUM 40 MILLIGRAM(S): 20 TABLET, DELAYED RELEASE ORAL at 12:26

## 2017-11-01 RX ADMIN — HEPARIN SODIUM 5000 UNIT(S): 5000 INJECTION INTRAVENOUS; SUBCUTANEOUS at 13:41

## 2017-11-01 RX ADMIN — POLYETHYLENE GLYCOL 3350 17 GRAM(S): 17 POWDER, FOR SOLUTION ORAL at 12:26

## 2017-11-01 RX ADMIN — OXYCODONE HYDROCHLORIDE 10 MILLIGRAM(S): 5 TABLET ORAL at 06:24

## 2017-11-01 RX ADMIN — Medication 25 MILLIGRAM(S): at 22:17

## 2017-11-01 RX ADMIN — OXYCODONE HYDROCHLORIDE 10 MILLIGRAM(S): 5 TABLET ORAL at 01:34

## 2017-11-01 RX ADMIN — Medication 1 MILLIGRAM(S): at 12:26

## 2017-11-01 RX ADMIN — Medication 500 MILLIGRAM(S): at 18:23

## 2017-11-01 RX ADMIN — OXYCODONE HYDROCHLORIDE 10 MILLIGRAM(S): 5 TABLET ORAL at 13:39

## 2017-11-01 RX ADMIN — Medication 200 MILLIGRAM(S): at 01:33

## 2017-11-01 RX ADMIN — CELECOXIB 200 MILLIGRAM(S): 200 CAPSULE ORAL at 08:34

## 2017-11-01 RX ADMIN — Medication 100 MILLIGRAM(S): at 06:24

## 2017-11-01 RX ADMIN — HEPARIN SODIUM 5000 UNIT(S): 5000 INJECTION INTRAVENOUS; SUBCUTANEOUS at 06:24

## 2017-11-01 NOTE — CONSULT NOTE ADULT - SUBJECTIVE AND OBJECTIVE BOX
Post op Total Knee Arthroplasty. No new complaints. OOB in chair.    GENERAL APPEARANCE:  The patient is alert, oriented and in no acute distress.  HEENT:  Head is normocephalic.  Pupils are equal and reactive.  The nares are patent.    NECK:  Supple without lymphadenopathy. Thyroid in not palpable, no venous distention, carotid pulses are equal bilaterally.  HEART:  Regular rate and rhythm, no murmurs, thrills or heaves or gallops.  LUNGS:  Clear to P&A.  No crackles or wheezes are heard.  ABDOMEN:  Soft, nontender, no organomegaly, no masses, nondistended with good bowel sounds heard.  There is no costovertebral angle tenderness  EXTREMITIES:  Without cyanosis, clubbing or edema, no calf tenderness, Dina's sign is negative. RTKA  NEUROLOGICAL:  Grossly nonfocal. Cranial nerves are  grossly within normal limits.   Skin:  Warm and dry without any rash.                            12.8   22.7  )-----------( 247      ( 01 Nov 2017 06:11 )             35.8     11-01    134<L>  |  101  |  19  ----------------------------<  111<H>  4.0   |  25  |  0.93    Ca    8.5      01 Nov 2017 06:11

## 2017-11-01 NOTE — PROGRESS NOTE ADULT - ASSESSMENT
67M s/p R TKA POD 1  Analgesia  DVT ppx  WBAT RLE  PT  Monitor HMV output  Incentive spirometry  DC planning

## 2017-11-01 NOTE — PROGRESS NOTE ADULT - ASSESSMENT
This is a 67 year old male with a past medical history of HTN/HLD, AAA without rupture, obesity, right knee osteoarthritis with worsening pain and swelling who underwent right total knee arthroplasty on 10/31/17 with Dr. Ortiz.         Plan: INR remains at 1.0, inc coumadin to   7.5 mg PO tonight  adjust dose per INR  cont Heparin 5,000 units SQ Q8hour  Daily PT/INR, CBC/BMP  Encourage ambulation  Maintain venodynes This is a 67 year old male with a past medical history of HTN/HLD, AAA without rupture, obesity, right knee osteoarthritis with worsening pain and swelling who underwent right total knee arthroplasty on 10/31/17 with Dr. Ortiz.         Plan: INR remains at 1.0,   inc coumadin to 7.5 mg PO tonight  adjust dose per INR  cont Heparin 5,000 units SQ Q8hour  Daily PT/INR, CBC/BMP  Encourage ambulation  Maintain venodynes

## 2017-11-02 VITALS
TEMPERATURE: 98 F | SYSTOLIC BLOOD PRESSURE: 129 MMHG | OXYGEN SATURATION: 98 % | DIASTOLIC BLOOD PRESSURE: 61 MMHG | RESPIRATION RATE: 16 BRPM | HEART RATE: 79 BPM

## 2017-11-02 LAB
ANION GAP SERPL CALC-SCNC: 7 MMOL/L — SIGNIFICANT CHANGE UP (ref 5–17)
BUN SERPL-MCNC: 18 MG/DL — SIGNIFICANT CHANGE UP (ref 7–23)
CALCIUM SERPL-MCNC: 8.7 MG/DL — SIGNIFICANT CHANGE UP (ref 8.5–10.1)
CHLORIDE SERPL-SCNC: 98 MMOL/L — SIGNIFICANT CHANGE UP (ref 96–108)
CO2 SERPL-SCNC: 27 MMOL/L — SIGNIFICANT CHANGE UP (ref 22–31)
CREAT SERPL-MCNC: 0.86 MG/DL — SIGNIFICANT CHANGE UP (ref 0.5–1.3)
GLUCOSE SERPL-MCNC: 97 MG/DL — SIGNIFICANT CHANGE UP (ref 70–99)
HCT VFR BLD CALC: 33.5 % — LOW (ref 39–50)
HGB BLD-MCNC: 11.9 G/DL — LOW (ref 13–17)
INR BLD: 1.2 RATIO — HIGH (ref 0.88–1.16)
MCHC RBC-ENTMCNC: 31.3 PG — SIGNIFICANT CHANGE UP (ref 27–34)
MCHC RBC-ENTMCNC: 35.7 GM/DL — SIGNIFICANT CHANGE UP (ref 32–36)
MCV RBC AUTO: 87.8 FL — SIGNIFICANT CHANGE UP (ref 80–100)
PLATELET # BLD AUTO: 229 K/UL — SIGNIFICANT CHANGE UP (ref 150–400)
POTASSIUM SERPL-MCNC: 3.8 MMOL/L — SIGNIFICANT CHANGE UP (ref 3.5–5.3)
POTASSIUM SERPL-SCNC: 3.8 MMOL/L — SIGNIFICANT CHANGE UP (ref 3.5–5.3)
PROTHROM AB SERPL-ACNC: 13 SEC — HIGH (ref 9.8–12.7)
RBC # BLD: 3.81 M/UL — LOW (ref 4.2–5.8)
RBC # FLD: 11.7 % — SIGNIFICANT CHANGE UP (ref 10.3–14.5)
SODIUM SERPL-SCNC: 132 MMOL/L — LOW (ref 135–145)
WBC # BLD: 14.9 K/UL — HIGH (ref 3.8–10.5)
WBC # FLD AUTO: 14.9 K/UL — HIGH (ref 3.8–10.5)

## 2017-11-02 PROCEDURE — 99233 SBSQ HOSP IP/OBS HIGH 50: CPT

## 2017-11-02 RX ADMIN — OXYCODONE HYDROCHLORIDE 10 MILLIGRAM(S): 5 TABLET ORAL at 05:05

## 2017-11-02 RX ADMIN — HEPARIN SODIUM 5000 UNIT(S): 5000 INJECTION INTRAVENOUS; SUBCUTANEOUS at 13:33

## 2017-11-02 RX ADMIN — Medication 325 MILLIGRAM(S): at 12:26

## 2017-11-02 RX ADMIN — Medication 100 MILLIGRAM(S): at 13:33

## 2017-11-02 RX ADMIN — Medication 650 MILLIGRAM(S): at 12:27

## 2017-11-02 RX ADMIN — PANTOPRAZOLE SODIUM 40 MILLIGRAM(S): 20 TABLET, DELAYED RELEASE ORAL at 12:26

## 2017-11-02 RX ADMIN — Medication 650 MILLIGRAM(S): at 14:54

## 2017-11-02 RX ADMIN — Medication 100 MILLIGRAM(S): at 05:21

## 2017-11-02 RX ADMIN — OXYCODONE HYDROCHLORIDE 10 MILLIGRAM(S): 5 TABLET ORAL at 04:10

## 2017-11-02 RX ADMIN — Medication 500 MILLIGRAM(S): at 05:21

## 2017-11-02 RX ADMIN — CELECOXIB 200 MILLIGRAM(S): 200 CAPSULE ORAL at 09:40

## 2017-11-02 RX ADMIN — CELECOXIB 200 MILLIGRAM(S): 200 CAPSULE ORAL at 08:56

## 2017-11-02 RX ADMIN — OXYCODONE HYDROCHLORIDE 10 MILLIGRAM(S): 5 TABLET ORAL at 05:21

## 2017-11-02 RX ADMIN — Medication 1 MILLIGRAM(S): at 12:26

## 2017-11-02 RX ADMIN — Medication 1 TABLET(S): at 05:20

## 2017-11-02 RX ADMIN — Medication 1 TABLET(S): at 12:27

## 2017-11-02 RX ADMIN — Medication 650 MILLIGRAM(S): at 13:15

## 2017-11-02 RX ADMIN — HEPARIN SODIUM 5000 UNIT(S): 5000 INJECTION INTRAVENOUS; SUBCUTANEOUS at 05:21

## 2017-11-02 RX ADMIN — LISINOPRIL 20 MILLIGRAM(S): 2.5 TABLET ORAL at 05:21

## 2017-11-02 RX ADMIN — Medication 650 MILLIGRAM(S): at 05:20

## 2017-11-02 RX ADMIN — OXYCODONE HYDROCHLORIDE 10 MILLIGRAM(S): 5 TABLET ORAL at 11:30

## 2017-11-02 RX ADMIN — OXYCODONE HYDROCHLORIDE 10 MILLIGRAM(S): 5 TABLET ORAL at 09:00

## 2017-11-02 RX ADMIN — Medication 325 MILLIGRAM(S): at 08:56

## 2017-11-02 RX ADMIN — Medication 1 TABLET(S): at 13:33

## 2017-11-02 RX ADMIN — OXYCODONE HYDROCHLORIDE 10 MILLIGRAM(S): 5 TABLET ORAL at 10:47

## 2017-11-02 RX ADMIN — POLYETHYLENE GLYCOL 3350 17 GRAM(S): 17 POWDER, FOR SOLUTION ORAL at 12:27

## 2017-11-02 NOTE — CHART NOTE - NSCHARTNOTEFT_GEN_A_CORE
dressing changed new aquacel and tegaderm  incision well appearing no erythema or discharge  c/d/i tolerated well  dc after 7 days

## 2017-11-02 NOTE — PROGRESS NOTE ADULT - ASSESSMENT
This is a 67 year old male with a past medical history of HTN/HLD, AAA without rupture, obesity, right knee osteoarthritis with worsening pain and swelling who underwent right total knee arthroplasty on 10/31/17 with Dr. Ortiz.         Plan: INR remains at 1.0, going home, coumadin dosage sheet provided and explained to pt  apex labs to drawn INR tomorrow with f/u by anticoag and then weekly on Mon/thurs  will need to go home on lovenox 40 mg sq q 12h until INR > 2.0, instructed on lovenox administration and Lovenox teaching kit provided to pt  instructed on s/s of bleeding an dclotting with approp measures to take  inc coumadin to 7.5 mg PO tonight  adjust dose per INR  cont Heparin 5,000 units SQ Q8hour  Daily PT/INR, CBC/BMP  Encourage ambulation  Maintain venodynes
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
## 2017-11-06 DIAGNOSIS — E78.5 HYPERLIPIDEMIA, UNSPECIFIED: ICD-10-CM

## 2017-11-06 DIAGNOSIS — I10 ESSENTIAL (PRIMARY) HYPERTENSION: ICD-10-CM

## 2017-11-06 DIAGNOSIS — M17.11 UNILATERAL PRIMARY OSTEOARTHRITIS, RIGHT KNEE: ICD-10-CM

## 2017-11-06 DIAGNOSIS — E66.9 OBESITY, UNSPECIFIED: ICD-10-CM

## 2017-11-07 LAB — SURGICAL PATHOLOGY FINAL REPORT - CH: SIGNIFICANT CHANGE UP

## 2017-11-10 DIAGNOSIS — M17.11 UNILATERAL PRIMARY OSTEOARTHRITIS, RIGHT KNEE: ICD-10-CM

## 2017-11-10 DIAGNOSIS — Z01.818 ENCOUNTER FOR OTHER PREPROCEDURAL EXAMINATION: ICD-10-CM

## 2017-11-10 DIAGNOSIS — E66.9 OBESITY, UNSPECIFIED: ICD-10-CM

## 2017-11-10 DIAGNOSIS — E78.5 HYPERLIPIDEMIA, UNSPECIFIED: ICD-10-CM

## 2017-11-10 DIAGNOSIS — I10 ESSENTIAL (PRIMARY) HYPERTENSION: ICD-10-CM

## 2018-05-17 ENCOUNTER — APPOINTMENT (OUTPATIENT)
Dept: CARDIOTHORACIC SURGERY | Facility: CLINIC | Age: 68
End: 2018-05-17
Payer: MEDICARE

## 2018-05-17 ENCOUNTER — RECORD ABSTRACTING (OUTPATIENT)
Age: 68
End: 2018-05-17

## 2018-05-17 VITALS
HEART RATE: 68 BPM | WEIGHT: 315 LBS | BODY MASS INDEX: 37.19 KG/M2 | DIASTOLIC BLOOD PRESSURE: 78 MMHG | RESPIRATION RATE: 18 BRPM | OXYGEN SATURATION: 98 % | HEIGHT: 77 IN | SYSTOLIC BLOOD PRESSURE: 127 MMHG

## 2018-05-17 DIAGNOSIS — Z86.79 PERSONAL HISTORY OF OTHER DISEASES OF THE CIRCULATORY SYSTEM: ICD-10-CM

## 2018-05-17 PROCEDURE — 99204 OFFICE O/P NEW MOD 45 MIN: CPT

## 2018-05-18 RX ORDER — CHROMIUM 200 MCG
TABLET ORAL
Refills: 0 | Status: ACTIVE | COMMUNITY

## 2018-05-18 RX ORDER — ASPIRIN ENTERIC COATED TABLETS 81 MG 81 MG/1
81 TABLET, DELAYED RELEASE ORAL
Refills: 0 | Status: ACTIVE | COMMUNITY

## 2018-05-18 RX ORDER — PNV NO.95/FERROUS FUM/FOLIC AC 28MG-0.8MG
TABLET ORAL
Refills: 0 | Status: ACTIVE | COMMUNITY

## 2018-05-25 ENCOUNTER — OUTPATIENT (OUTPATIENT)
Dept: OUTPATIENT SERVICES | Facility: HOSPITAL | Age: 68
LOS: 1 days | End: 2018-05-25
Payer: MEDICARE

## 2018-05-25 DIAGNOSIS — I71.9 AORTIC ANEURYSM OF UNSPECIFIED SITE, WITHOUT RUPTURE: ICD-10-CM

## 2018-05-25 DIAGNOSIS — Z90.89 ACQUIRED ABSENCE OF OTHER ORGANS: Chronic | ICD-10-CM

## 2018-05-25 DIAGNOSIS — Z98.890 OTHER SPECIFIED POSTPROCEDURAL STATES: Chronic | ICD-10-CM

## 2018-05-25 LAB
BUN SERPL-MCNC: 19 MG/DL — SIGNIFICANT CHANGE UP (ref 8–20)
CREAT SERPL-MCNC: 0.93 MG/DL — SIGNIFICANT CHANGE UP (ref 0.5–1.3)

## 2018-05-25 PROCEDURE — 71275 CT ANGIOGRAPHY CHEST: CPT | Mod: 26

## 2018-05-25 PROCEDURE — 82565 ASSAY OF CREATININE: CPT

## 2018-05-25 PROCEDURE — 71275 CT ANGIOGRAPHY CHEST: CPT

## 2018-05-25 PROCEDURE — 84520 ASSAY OF UREA NITROGEN: CPT

## 2018-05-25 PROCEDURE — 36415 COLL VENOUS BLD VENIPUNCTURE: CPT

## 2019-05-16 PROBLEM — H93.13 TINNITUS, BILATERAL: Chronic | Status: ACTIVE | Noted: 2017-10-18

## 2019-05-16 PROBLEM — M54.5 LOW BACK PAIN: Chronic | Status: ACTIVE | Noted: 2017-10-18

## 2019-05-16 PROBLEM — L85.3 XEROSIS CUTIS: Chronic | Status: ACTIVE | Noted: 2017-10-18

## 2019-05-16 PROBLEM — Z86.010 PERSONAL HISTORY OF COLON POLYPS: Chronic | Status: ACTIVE | Noted: 2017-10-18

## 2019-05-16 PROBLEM — E78.5 HYPERLIPIDEMIA, UNSPECIFIED: Chronic | Status: ACTIVE | Noted: 2017-10-18

## 2019-05-16 PROBLEM — E66.9 OBESITY, UNSPECIFIED: Chronic | Status: ACTIVE | Noted: 2017-10-18

## 2019-05-16 PROBLEM — M17.11 UNILATERAL PRIMARY OSTEOARTHRITIS, RIGHT KNEE: Chronic | Status: ACTIVE | Noted: 2017-10-18

## 2019-05-16 PROBLEM — M25.561 PAIN IN RIGHT KNEE: Chronic | Status: ACTIVE | Noted: 2017-10-18

## 2019-05-16 PROBLEM — D72.829 ELEVATED WHITE BLOOD CELL COUNT, UNSPECIFIED: Chronic | Status: ACTIVE | Noted: 2017-10-18

## 2019-05-16 PROBLEM — I10 ESSENTIAL (PRIMARY) HYPERTENSION: Chronic | Status: ACTIVE | Noted: 2017-10-18

## 2019-05-16 PROBLEM — K64.9 UNSPECIFIED HEMORRHOIDS: Chronic | Status: ACTIVE | Noted: 2017-10-18

## 2019-05-16 PROBLEM — M50.20 OTHER CERVICAL DISC DISPLACEMENT, UNSPECIFIED CERVICAL REGION: Chronic | Status: ACTIVE | Noted: 2017-10-18

## 2019-05-16 PROBLEM — I71.9 AORTIC ANEURYSM OF UNSPECIFIED SITE, WITHOUT RUPTURE: Chronic | Status: ACTIVE | Noted: 2017-10-18

## 2019-05-16 PROBLEM — Z87.828 PERSONAL HISTORY OF OTHER (HEALED) PHYSICAL INJURY AND TRAUMA: Chronic | Status: ACTIVE | Noted: 2017-10-18

## 2019-05-16 PROBLEM — Z86.010 PERSONAL HISTORY OF COLONIC POLYPS: Chronic | Status: ACTIVE | Noted: 2017-10-18

## 2019-05-24 ENCOUNTER — OUTPATIENT (OUTPATIENT)
Dept: OUTPATIENT SERVICES | Facility: HOSPITAL | Age: 69
LOS: 1 days | End: 2019-05-24

## 2019-05-24 ENCOUNTER — APPOINTMENT (OUTPATIENT)
Dept: CT IMAGING | Facility: CLINIC | Age: 69
End: 2019-05-24
Payer: MEDICARE

## 2019-05-24 DIAGNOSIS — Z98.890 OTHER SPECIFIED POSTPROCEDURAL STATES: Chronic | ICD-10-CM

## 2019-05-24 DIAGNOSIS — I71.9 AORTIC ANEURYSM OF UNSPECIFIED SITE, WITHOUT RUPTURE: ICD-10-CM

## 2019-05-24 DIAGNOSIS — Z90.89 ACQUIRED ABSENCE OF OTHER ORGANS: Chronic | ICD-10-CM

## 2019-05-24 PROCEDURE — 71275 CT ANGIOGRAPHY CHEST: CPT | Mod: 26

## 2020-05-19 ENCOUNTER — RESULT REVIEW (OUTPATIENT)
Age: 70
End: 2020-05-19

## 2020-05-19 ENCOUNTER — APPOINTMENT (OUTPATIENT)
Dept: CT IMAGING | Facility: CLINIC | Age: 70
End: 2020-05-19
Payer: MEDICARE

## 2020-05-19 ENCOUNTER — OUTPATIENT (OUTPATIENT)
Dept: OUTPATIENT SERVICES | Facility: HOSPITAL | Age: 70
LOS: 1 days | End: 2020-05-19
Payer: MEDICARE

## 2020-05-19 DIAGNOSIS — I71.9 AORTIC ANEURYSM OF UNSPECIFIED SITE, WITHOUT RUPTURE: ICD-10-CM

## 2020-05-19 DIAGNOSIS — Z98.890 OTHER SPECIFIED POSTPROCEDURAL STATES: Chronic | ICD-10-CM

## 2020-05-19 DIAGNOSIS — Z90.89 ACQUIRED ABSENCE OF OTHER ORGANS: Chronic | ICD-10-CM

## 2020-05-19 PROCEDURE — 71275 CT ANGIOGRAPHY CHEST: CPT

## 2020-05-19 PROCEDURE — 71275 CT ANGIOGRAPHY CHEST: CPT | Mod: 26

## 2020-12-01 NOTE — H&P PST ADULT - ALLERGIC/IMMUNOLOGIC
"[facility]    Consulting Provider: Dr. Gerhard Kumar    CHIEF COMPLAINT:   Chief Complaint   Patient presents with    Muscle Pain     Myoascial Pain     HISTORY OF PRESENT ILLNESS: Cheryl Peter is a 29 y.o. female who presents to me on referral from Dr. Kumar for evaluation of myofascial neck and shoulder pain. Patient reports onset of left sided neck "tightness" and left shoulder blade numbness/tingling that began around 6 months ago. The episodes would last for several hours and typically resolve after physical therapy sessions/dry needling. There was no trauma or inciting event. She does note that she often holds her 30lb baby with her left arm, but otherwise there has been no significant changes at the time of symptom onset. She has been enrolled in physical therapy for the past 3 months, which does help the pain. Manipulation therapy and dry needling will relieve her pain and numbness for a few days. She tried taking Tylenol and Advil, which did not help. She will sometimes get occipital headaches during episodes of her neck pain. She denies any paresthesias or shooting pain down the left arm. The pain does not wake her up at night. There is no time of day during which the pain is worse. She has not had TPIs in the past. MRI neck was performed and revealed normal findings.    Review of Systems   Constitutional: Negative for fever.    Eyes: Negative for discharge or changes in vision.  Respiratory: Negative for choking.    Cardiovascular: Negative for chest pain.   Genitourinary: Negative for flank pain.   Skin: Negative for wound.   Allergic/Immunologic: Negative for immunocompromised state.   Neurological: Negative for tremors and syncope.   Psychiatric/Behavioral: Negative for behavioral problems.     Past Medical History: History reviewed. No pertinent past medical history.    Past Surgical History:   Past Surgical History:   Procedure Laterality Date    ankle arthroscopy      " DILATION AND CURETTAGE OF UTERUS      TONSILLECTOMY         Family History:   Family History   Problem Relation Age of Onset    Diabetes Mother     Hyperlipidemia Mother     Heart disease Father     Hyperlipidemia Father     Diabetes Father        Medications:   Current Outpatient Medications on File Prior to Visit   Medication Sig Dispense Refill    methocarbamoL (ROBAXIN) 500 MG Tab Take 1 tablet (500 mg total) by mouth 2 (two) times daily as needed. 30 tablet 0    PNV 79-iron-folic-lmfolate-dha 27 mg-400 mcg- 1.13 mg-250 mg Cap Take 1 capsule by mouth once daily. 90 capsule 6     No current facility-administered medications on file prior to visit.        Allergies:   Review of patient's allergies indicates:   Allergen Reactions    Penicillins Hives and Rash       Social History:   Social History     Socioeconomic History    Marital status:      Spouse name: Not on file    Number of children: Not on file    Years of education: Not on file    Highest education level: Not on file   Occupational History    Not on file   Social Needs    Financial resource strain: Not on file    Food insecurity     Worry: Not on file     Inability: Not on file    Transportation needs     Medical: Not on file     Non-medical: Not on file   Tobacco Use    Smoking status: Never Smoker    Smokeless tobacco: Never Used   Substance and Sexual Activity    Alcohol use: No    Drug use: Not on file    Sexual activity: Not on file   Lifestyle    Physical activity     Days per week: Not on file     Minutes per session: Not on file    Stress: Not on file   Relationships    Social connections     Talks on phone: Not on file     Gets together: Not on file     Attends Restorationism service: Not on file     Active member of club or organization: Not on file     Attends meetings of clubs or organizations: Not on file     Relationship status: Not on file   Other Topics Concern    Not on file   Social History Narrative    Not  "on file     Cheryl lives in [place] with her 1 year old baby girl.    PHYSICAL EXAMINATION:   General    Vitals:    12/01/20 0903   Weight: 51 kg (112 lb 7 oz)   Height: 5' 3" (1.6 m)     Constitutional: Oriented to person, place, and time. No apparent distress. Pleasant.  HENT:   Head: Normocephalic and atraumatic.   Eyes: Right eye exhibits no discharge. Left eye exhibits no discharge. No scleral icterus.   Pulmonary/Chest: Effort normal. No respiratory distress.   Abdominal: There is no guarding.   Neurological: Alert and oriented to person, place, and time.   Psychiatric: Behavior is normal.   Right Shoulder Exam   Right shoulder exam is normal.    Muscle Strength   The patient has normal right shoulder strength.    Other   Sensation: normal      Left Shoulder Exam   Left shoulder exam is normal.    Tenderness   The patient is experiencing no tenderness.     Range of Motion   Active abduction: 160   Passive abduction: 160   Forward flexion: 160     Muscle Strength   Abduction: 5/5   Internal rotation: 5/5   External rotation: 5/5   Biceps: 5/5     Tests   Cross arm: negative  Impingement: negative  Drop arm: negative  Sulcus: absent    Other   Erythema: absent  Scars: absent  Sensation: normal  Pulse: present         INSPECTION: There is no swelling, ecchymoses, erythema of the left shoulder. Left scapula elevated in comparison to the right.  Palpation: There is no tenderness to palpation along the cervical spine or along the cervical paraspinals and trapezius musculature.  Range of motion: There is approximately 55° of cervical extension with no discomfort, there is 50° of cervical flexion. There is approximately 45° of lateral bending on the left and 50 degrees on the right. There is approximately 75° of lateral rotation on the left and 80 degrees on the right.  Neurologic: Manual muscle testing reveals 5 out of 5 strength throughout bilateral upper extremity's. Tone is normal about the bilateral upper " "extremity's. Reflexes are 2+ throughout bilateral upper extremities. Brown's reflex is absent bilaterally.  Gait: Normal    Imaging  MRI C-spine 11/12/20     No evidence of significant degenerative change, cord signal abnormality, or neural foraminal/spinal canal stenosis within the cervical spine.    ASSESSMENT:   1. Scapular dysfunction    2. Myofascial pain      PLAN:     1. Time was spent reviewing the diagnosis of myofascial pain in depth with Cheryl today, including acute management and rehabilitation.     2. Given her normal exam findings, normal imaging, and absence of red flag signs/symptoms, I believe that the etiology of her pain is more likely to be myofascial in origin rather than sequelae of a disc/facet pathology originating in the cervical spine.     3. We discussed the appropriate plan of care for myofascial pain at length today. She should continue outpatient physical therapy and home stretching. She would continue to benefit from dry needling. Should her pain not improve over the next 1-2 months, she was offered the option of returning to clinic for trigger point injections. On examination today, there were no identifiable trigger points.     4. The consideration of using gabapentin and/or a muscle relaxer was introduced to the patient if the numbness/tingling worsens or begins to disrupt her sleep. At this time, she does not feel as though the paresthesias is bad enough to warrant addition of oral medications.    5. RTC as needed. Ms. Peter will call to schedule TPIs should her pain worsen over the next 1-2 months.    This is a consult from Dr. Gerhard Kumar. Please see the "Communications" section of Epic to see how the consulting physician received the report of today's findings and recommendations. If it's an [facility] physician, it will be forwarded to his/her "in basket".    The above note was completed, in part, with the aid of Dragon dictation software/hardware. Translation errors may " be present.     negative

## 2022-01-12 ENCOUNTER — RESULT REVIEW (OUTPATIENT)
Age: 72
End: 2022-01-12

## 2022-01-12 ENCOUNTER — OUTPATIENT (OUTPATIENT)
Dept: OUTPATIENT SERVICES | Facility: HOSPITAL | Age: 72
LOS: 1 days | End: 2022-01-12
Payer: MEDICARE

## 2022-01-12 ENCOUNTER — APPOINTMENT (OUTPATIENT)
Dept: CT IMAGING | Facility: CLINIC | Age: 72
End: 2022-01-12
Payer: MEDICARE

## 2022-01-12 DIAGNOSIS — I71.9 AORTIC ANEURYSM OF UNSPECIFIED SITE, WITHOUT RUPTURE: ICD-10-CM

## 2022-01-12 DIAGNOSIS — Z90.89 ACQUIRED ABSENCE OF OTHER ORGANS: Chronic | ICD-10-CM

## 2022-01-12 DIAGNOSIS — Z98.890 OTHER SPECIFIED POSTPROCEDURAL STATES: Chronic | ICD-10-CM

## 2022-01-12 PROCEDURE — 71275 CT ANGIOGRAPHY CHEST: CPT | Mod: MH

## 2022-01-12 PROCEDURE — 71275 CT ANGIOGRAPHY CHEST: CPT | Mod: 26,MH

## 2022-02-25 ENCOUNTER — APPOINTMENT (OUTPATIENT)
Dept: GASTROENTEROLOGY | Facility: CLINIC | Age: 72
End: 2022-02-25
Payer: MEDICARE

## 2022-02-25 VITALS — HEIGHT: 77 IN | WEIGHT: 315 LBS | BODY MASS INDEX: 37.19 KG/M2

## 2022-02-25 DIAGNOSIS — Z12.11 ENCOUNTER FOR SCREENING FOR MALIGNANT NEOPLASM OF COLON: ICD-10-CM

## 2022-02-25 PROCEDURE — 99202 OFFICE O/P NEW SF 15 MIN: CPT

## 2022-02-25 RX ORDER — SODIUM SULFATE, MAGNESIUM SULFATE, AND POTASSIUM CHLORIDE 17.75; 2.7; 2.25 G/1; G/1; G/1
1479-225-188 TABLET ORAL
Qty: 24 | Refills: 0 | Status: DISCONTINUED | COMMUNITY
Start: 2022-02-25 | End: 2022-02-25

## 2022-02-25 NOTE — HISTORY OF PRESENT ILLNESS
[de-identified] : Mr. JASMINA NEWMAN is a 71 year old male presents for screening colonoscopy. Patient has no complaints of bowel issues, bleeding, abdominal pain, family history of colon cancer, GERD symptoms. Patient had last colonoscopy several years ago. Patient reports a personal history of colon polyps. \par \par

## 2022-04-06 ENCOUNTER — APPOINTMENT (OUTPATIENT)
Dept: GASTROENTEROLOGY | Facility: HOSPITAL | Age: 72
End: 2022-04-06

## 2022-04-06 ENCOUNTER — OUTPATIENT (OUTPATIENT)
Dept: OUTPATIENT SERVICES | Facility: HOSPITAL | Age: 72
LOS: 1 days | Discharge: HOME | End: 2022-04-06
Payer: MEDICARE

## 2022-04-06 VITALS
OXYGEN SATURATION: 94 % | SYSTOLIC BLOOD PRESSURE: 127 MMHG | HEIGHT: 77 IN | RESPIRATION RATE: 13 BRPM | DIASTOLIC BLOOD PRESSURE: 56 MMHG | WEIGHT: 315 LBS | TEMPERATURE: 98 F | HEART RATE: 56 BPM

## 2022-04-06 DIAGNOSIS — Z12.11 ENCOUNTER FOR SCREENING FOR MALIGNANT NEOPLASM OF COLON: ICD-10-CM

## 2022-04-06 DIAGNOSIS — Z90.89 ACQUIRED ABSENCE OF OTHER ORGANS: Chronic | ICD-10-CM

## 2022-04-06 DIAGNOSIS — Z98.890 OTHER SPECIFIED POSTPROCEDURAL STATES: Chronic | ICD-10-CM

## 2022-04-06 PROCEDURE — G0105: CPT

## 2022-04-06 RX ORDER — ROSUVASTATIN CALCIUM 5 MG/1
1 TABLET ORAL
Qty: 0 | Refills: 0 | DISCHARGE

## 2022-04-06 RX ORDER — LISINOPRIL/HYDROCHLOROTHIAZIDE 10-12.5 MG
1 TABLET ORAL
Qty: 0 | Refills: 0 | DISCHARGE

## 2022-04-06 RX ORDER — ASPIRIN/CALCIUM CARB/MAGNESIUM 324 MG
1 TABLET ORAL
Qty: 0 | Refills: 0 | DISCHARGE

## 2022-04-06 RX ORDER — METOPROLOL TARTRATE 50 MG
1 TABLET ORAL
Qty: 0 | Refills: 0 | DISCHARGE

## 2022-04-06 RX ORDER — CHOLECALCIFEROL (VITAMIN D3) 125 MCG
1 CAPSULE ORAL
Qty: 0 | Refills: 0 | DISCHARGE

## 2022-04-06 RX ORDER — ASCORBIC ACID 60 MG
1 TABLET,CHEWABLE ORAL
Qty: 0 | Refills: 0 | DISCHARGE
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
## 2022-04-06 NOTE — ASU PATIENT PROFILE, ADULT - FALL HARM RISK - UNIVERSAL INTERVENTIONS
Bed in lowest position, wheels locked, appropriate side rails in place/Call bell, personal items and telephone in reach/Instruct patient to call for assistance before getting out of bed or chair/Non-slip footwear when patient is out of bed/[place] to call system/Physically safe environment - no spills, clutter or unnecessary equipment/Purposeful Proactive Rounding/Room/bathroom lighting operational, light cord in reach

## 2022-04-12 DIAGNOSIS — Z91.018 ALLERGY TO OTHER FOODS: ICD-10-CM

## 2022-04-12 DIAGNOSIS — Z12.11 ENCOUNTER FOR SCREENING FOR MALIGNANT NEOPLASM OF COLON: ICD-10-CM

## 2022-04-12 DIAGNOSIS — K64.8 OTHER HEMORRHOIDS: ICD-10-CM

## 2022-04-12 DIAGNOSIS — Z86.010 PERSONAL HISTORY OF COLONIC POLYPS: ICD-10-CM

## 2022-04-12 DIAGNOSIS — E78.5 HYPERLIPIDEMIA, UNSPECIFIED: ICD-10-CM

## 2022-04-12 DIAGNOSIS — Z88.1 ALLERGY STATUS TO OTHER ANTIBIOTIC AGENTS STATUS: ICD-10-CM

## 2022-04-12 DIAGNOSIS — Z79.82 LONG TERM (CURRENT) USE OF ASPIRIN: ICD-10-CM

## 2022-04-12 DIAGNOSIS — K57.30 DIVERTICULOSIS OF LARGE INTESTINE WITHOUT PERFORATION OR ABSCESS WITHOUT BLEEDING: ICD-10-CM

## 2022-04-12 DIAGNOSIS — I10 ESSENTIAL (PRIMARY) HYPERTENSION: ICD-10-CM

## 2023-02-21 ENCOUNTER — OUTPATIENT (OUTPATIENT)
Dept: OUTPATIENT SERVICES | Facility: HOSPITAL | Age: 73
LOS: 1 days | End: 2023-02-21
Payer: MEDICARE

## 2023-02-21 ENCOUNTER — APPOINTMENT (OUTPATIENT)
Dept: CT IMAGING | Facility: CLINIC | Age: 73
End: 2023-02-21
Payer: MEDICARE

## 2023-02-21 DIAGNOSIS — Z98.890 OTHER SPECIFIED POSTPROCEDURAL STATES: Chronic | ICD-10-CM

## 2023-02-21 DIAGNOSIS — Z86.79 PERSONAL HISTORY OF OTHER DISEASES OF THE CIRCULATORY SYSTEM: ICD-10-CM

## 2023-02-21 DIAGNOSIS — Z90.89 ACQUIRED ABSENCE OF OTHER ORGANS: Chronic | ICD-10-CM

## 2023-02-21 PROCEDURE — 71275 CT ANGIOGRAPHY CHEST: CPT

## 2023-02-21 PROCEDURE — 71275 CT ANGIOGRAPHY CHEST: CPT | Mod: 26,MH

## 2023-04-07 ENCOUNTER — NON-APPOINTMENT (OUTPATIENT)
Age: 73
End: 2023-04-07

## 2023-11-16 NOTE — ASU PREOP CHECKLIST - ALLERGIES REVIEWED
done
Detail Level: Detailed
Render Path Notes In Note?: No
Lab: [id number]
Lab Facility: [id number]
Billing Type: Third-Party Bill

## 2024-01-19 NOTE — H&P PST ADULT - HEIGHT IN FEET
Patient was educated by phone on PFO for Dx: PFO.  Procedure is scheduled for 1/24/24 @ 12pm, w/arrival @ 10am, @ [facility]. Pre-admission orders were given to patient for labs & CXR, which are due 1/19/24 @ [facility].       Procedure and risks were explained to patient. Consent forms were signed.       Patient was notified that procedure could be delayed due to an emergency. Patient voiced understanding.                 6

## 2024-02-09 ENCOUNTER — APPOINTMENT (OUTPATIENT)
Dept: CT IMAGING | Facility: CLINIC | Age: 74
End: 2024-02-09
Payer: MEDICARE

## 2024-02-09 ENCOUNTER — OUTPATIENT (OUTPATIENT)
Dept: OUTPATIENT SERVICES | Facility: HOSPITAL | Age: 74
LOS: 1 days | End: 2024-02-09
Payer: MEDICARE

## 2024-02-09 DIAGNOSIS — Z98.890 OTHER SPECIFIED POSTPROCEDURAL STATES: Chronic | ICD-10-CM

## 2024-02-09 DIAGNOSIS — Z90.89 ACQUIRED ABSENCE OF OTHER ORGANS: Chronic | ICD-10-CM

## 2024-02-09 DIAGNOSIS — Z86.79 PERSONAL HISTORY OF OTHER DISEASES OF THE CIRCULATORY SYSTEM: ICD-10-CM

## 2024-02-09 PROCEDURE — 71275 CT ANGIOGRAPHY CHEST: CPT

## 2024-02-09 PROCEDURE — 71275 CT ANGIOGRAPHY CHEST: CPT | Mod: 26,MH

## 2024-02-15 ENCOUNTER — NON-APPOINTMENT (OUTPATIENT)
Age: 74
End: 2024-02-15

## 2024-03-05 ENCOUNTER — APPOINTMENT (OUTPATIENT)
Dept: CARDIOTHORACIC SURGERY | Facility: CLINIC | Age: 74
End: 2024-03-05
Payer: MEDICARE

## 2024-03-05 VITALS
RESPIRATION RATE: 16 BRPM | OXYGEN SATURATION: 97 % | HEART RATE: 67 BPM | HEIGHT: 77 IN | DIASTOLIC BLOOD PRESSURE: 81 MMHG | SYSTOLIC BLOOD PRESSURE: 131 MMHG | WEIGHT: 315 LBS | BODY MASS INDEX: 37.19 KG/M2

## 2024-03-05 DIAGNOSIS — Z86.79 PERSONAL HISTORY OF OTHER DISEASES OF THE CIRCULATORY SYSTEM: ICD-10-CM

## 2024-03-05 DIAGNOSIS — I71.20 THORACIC AORTIC ANEURYSM, WITHOUT RUPTURE, UNSPECIFIED: ICD-10-CM

## 2024-03-05 PROCEDURE — 99203 OFFICE O/P NEW LOW 30 MIN: CPT

## 2024-03-05 NOTE — ASSESSMENT
[FreeTextEntry1] : Today we discussed the disease process with regards to ascending aortic aneurysm. This is not something that we can change or reduce in size medically but something that we monitor for the long term, assessing for growth in total size and rate of change.   The current measurement of this patient's ascending aorta is 4.8 cm, which we do not yet consider an indication for surgery. Surgery is generally indicated when the aortic size is closer to 5.5 cm for a normal functioning, trileaflet aortic valve.   Protective strategies were discussed which include blood pressure management and prevention of very heavy or prolonged lifting and Vagal Maneuvers. We will be continuing the use of the aortic registry for surveillance, and he is to follow up with the next CT scan of chest with IV contrast in 2 years. The patient was in full understanding of and in agreement with plan going forward. All questions answered and concerns were addressed.  There is data to suggest that selecting inhibition of the angiotensin I receptor, while preserving angiotensin II receptor signaling may be protective in this patient population. This affect is at the subcellular level and independent of blood pressure affect. The addition, or change, to a low-dose ARB may therefore be advantageous.   PLAN: - CTA chest in 2 years - Hypertension Management - Maintain Aortic registry - Maintain routine follow up with Primary Care and Cardiology Providers     I, Dr. Motley personally performed the evaluation and management (E/M) services for this new patient. That E/M includes conducting the initial examination, assessing all conditions, and establishing the plan of care. Today, Issa Valverde NP was here to observe my evaluation and management services for this patient.

## 2024-03-05 NOTE — DATA REVIEWED
[FreeTextEntry1] : CT ANGIO CHEST AORTA [facility]  PROCEDURE DATE:  02/09/2024  COMPARISON: Multiple CT, most recent 2/21/2023.  FINDINGS:  AIRWAYS AND LUNGS: The central tracheobronchial tree is patent.  The lungs are clear.  MEDIASTINUM AND PLEURA: There are no enlarged mediastinal, hilar or axillary lymph nodes. The visualized portion of the thyroid gland is heterogeneous. There is no pleural effusion. There is no pneumothorax.  HEART AND VESSELS: There is mild cardiomegaly.   There are atherosclerotic calcifications of the aorta and coronary arteries.  There is no pericardial effusion.  Aortic root measures 4.8 cm, unchanged. Ascending aorta measures 4.5 cm, unchanged.  UPPER ABDOMEN: Images of the upper abdomen demonstrate no abnormality.  BONES AND SOFT TISSUES: There are mild degenerative changes of the spine.  The soft tissues are unremarkable.  IMPRESSION: Stable exam     CT ANGIO CHEST (W)AW IC from 05/19/2020 Comparison: May 24, 2019  Tubes/Lines: None  Mediastinum and Heart: Aorta and pulmonary arteries are normal in size. Thyroid gland is unremarkable. No lymphadenopathy. No pericardial effusion.   Aortic measurements as follows: Sinuses of Valsalva: 4.9 x 4.8 x 4.8 cm Sinotubular junction: 4.2 x 4.4 cm Ascending aorta: 4.4 x 4.5 cm Aortic arch: 2.7 x 2.9 cm Descending thoracic aorta: 2.4 x 2.5 cm  These measurements are without significant change from prior given slight differences in measurement technique.  No intramural hematoma or dissection.  Lungs, Pleura, and Airways: There is no pulmonary embolus. No consolidations, edema, effusion, or pneumothorax.  Visualized Abdomen: Unremarkable.  Bones and soft tissues: Unremarkable.  IMPRESSION: No change since May 24, 2019 examination.  Aortic measurements as follows: Sinuses of Valsalva: 4.9 x 4.8 x 4.8 cm Sinotubular junction: 4.2 x 4.4 cm Ascending aorta: 4.4 x 4.5 cm Aortic arch: 2.7 x 2.9 cm Descending thoracic aorta: 2.4 x 2.5 cm  These measurements are without significant change from prior given slight differences in measurement technique.  No intramural hematoma or dissection.
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
## 2024-03-05 NOTE — REVIEW OF SYSTEMS
[Negative] : Heme/Lymph [Feeling Poorly] : not feeling poorly [Feeling Tired] : not feeling tired [Chest Pain] : no chest pain [Palpitations] : no palpitations [Lower Ext Edema] : no extremity edema [Shortness Of Breath] : no shortness of breath [SOB on Exertion] : no shortness of breath during exertion

## 2024-03-05 NOTE — PHYSICAL EXAM
[Neck Appearance] : the appearance of the neck was normal [Sclera] : the sclera and conjunctiva were normal [Auscultation Breath Sounds / Voice Sounds] : lungs were clear to auscultation bilaterally [Respiration, Rhythm And Depth] : normal respiratory rhythm and effort [Heart Rate And Rhythm] : heart rate was normal and rhythm regular [Heart Sounds] : normal S1 and S2 [Examination Of The Chest] : the chest was normal in appearance [Abnormal Walk] : normal gait [Skin Color & Pigmentation] : normal skin color and pigmentation [Sensation] : the sensory exam was normal to light touch and pinprick [Oriented To Time, Place, And Person] : oriented to person, place, and time [Impaired Insight] : insight and judgment were intact [FreeTextEntry1] : NYHAC I no murmur

## 2024-04-12 ENCOUNTER — APPOINTMENT (OUTPATIENT)
Dept: GASTROENTEROLOGY | Facility: CLINIC | Age: 74
End: 2024-04-12
Payer: MEDICARE

## 2024-04-12 VITALS
DIASTOLIC BLOOD PRESSURE: 60 MMHG | BODY MASS INDEX: 37.19 KG/M2 | HEIGHT: 77 IN | WEIGHT: 315 LBS | SYSTOLIC BLOOD PRESSURE: 120 MMHG

## 2024-04-12 DIAGNOSIS — Z12.11 ENCOUNTER FOR SCREENING FOR MALIGNANT NEOPLASM OF COLON: ICD-10-CM

## 2024-04-12 DIAGNOSIS — K92.1 MELENA: ICD-10-CM

## 2024-04-12 PROCEDURE — 99213 OFFICE O/P EST LOW 20 MIN: CPT

## 2024-04-12 RX ORDER — CIPROFLOXACIN HYDROCHLORIDE 500 MG/1
500 TABLET, FILM COATED ORAL
Refills: 0 | Status: ACTIVE | COMMUNITY

## 2024-04-12 RX ORDER — PANTOPRAZOLE 40 MG/1
40 TABLET, DELAYED RELEASE ORAL
Refills: 0 | Status: ACTIVE | COMMUNITY

## 2024-04-12 RX ORDER — SODIUM SULFATE, MAGNESIUM SULFATE, AND POTASSIUM CHLORIDE 17.75; 2.7; 2.25 G/1; G/1; G/1
1479-225-188 TABLET ORAL
Qty: 24 | Refills: 0 | Status: DISCONTINUED | COMMUNITY
Start: 2022-02-25 | End: 2024-04-12

## 2024-04-12 NOTE — ASSESSMENT
[FreeTextEntry1] : 74 yo male with history of resolved dark stool.  Patient advised to discontinue use of Naprosyn and to use PPIs.  He is not anemic.  Plan to arrange upper endoscopy at [facility] to look for etiology of dark stool.

## 2024-04-12 NOTE — HISTORY OF PRESENT ILLNESS
[FreeTextEntry1] : Mr. JASMINA NEWMAN is a 73 year old male with history of dark stool.  Patient reports having had episode of dark stool after exertion.  Patient had been taking large doses of naproxen.  The dark stool resolved spontaneously without therapy.  Patient went to see his primary doctor where he had a normal CT scan of the abdomen laboratory tests were done and he was not found to be anemic.  Patient currently feels well and his stool is returned to normal.

## 2024-09-04 ENCOUNTER — OUTPATIENT (OUTPATIENT)
Dept: OUTPATIENT SERVICES | Facility: HOSPITAL | Age: 74
LOS: 1 days | Discharge: HOME | End: 2024-09-04
Payer: MEDICARE

## 2024-09-04 ENCOUNTER — RESULT REVIEW (OUTPATIENT)
Age: 74
End: 2024-09-04

## 2024-09-04 ENCOUNTER — APPOINTMENT (OUTPATIENT)
Dept: GASTROENTEROLOGY | Facility: HOSPITAL | Age: 74
End: 2024-09-04

## 2024-09-04 VITALS
TEMPERATURE: 98 F | HEART RATE: 67 BPM | WEIGHT: 315 LBS | OXYGEN SATURATION: 99 % | HEIGHT: 77 IN | SYSTOLIC BLOOD PRESSURE: 141 MMHG | DIASTOLIC BLOOD PRESSURE: 71 MMHG | RESPIRATION RATE: 25 BRPM

## 2024-09-04 DIAGNOSIS — Z98.890 OTHER SPECIFIED POSTPROCEDURAL STATES: Chronic | ICD-10-CM

## 2024-09-04 DIAGNOSIS — Z90.89 ACQUIRED ABSENCE OF OTHER ORGANS: Chronic | ICD-10-CM

## 2024-09-04 DIAGNOSIS — K92.1 MELENA: ICD-10-CM

## 2024-09-04 PROCEDURE — 88305 TISSUE EXAM BY PATHOLOGIST: CPT | Mod: 26

## 2024-09-04 PROCEDURE — 88312 SPECIAL STAINS GROUP 1: CPT | Mod: 26

## 2024-09-04 PROCEDURE — 88305 TISSUE EXAM BY PATHOLOGIST: CPT

## 2024-09-04 PROCEDURE — 88312 SPECIAL STAINS GROUP 1: CPT

## 2024-09-04 PROCEDURE — 44361 SMALL BOWEL ENDOSCOPY/BIOPSY: CPT

## 2024-09-04 RX ORDER — PANTOPRAZOLE 40 MG/1
40 TABLET, DELAYED RELEASE ORAL DAILY
Qty: 1 | Refills: 3 | Status: ACTIVE | COMMUNITY
Start: 2024-09-04 | End: 1900-01-01

## 2024-09-05 LAB — SURGICAL PATHOLOGY STUDY: SIGNIFICANT CHANGE UP

## 2024-09-06 DIAGNOSIS — K29.50 UNSPECIFIED CHRONIC GASTRITIS WITHOUT BLEEDING: ICD-10-CM

## 2024-09-06 DIAGNOSIS — Z79.82 LONG TERM (CURRENT) USE OF ASPIRIN: ICD-10-CM

## 2024-09-06 DIAGNOSIS — K92.1 MELENA: ICD-10-CM

## 2024-09-06 DIAGNOSIS — E66.01 MORBID (SEVERE) OBESITY DUE TO EXCESS CALORIES: ICD-10-CM

## 2024-09-06 DIAGNOSIS — I10 ESSENTIAL (PRIMARY) HYPERTENSION: ICD-10-CM

## 2024-09-06 DIAGNOSIS — Z96.651 PRESENCE OF RIGHT ARTIFICIAL KNEE JOINT: ICD-10-CM

## 2024-11-15 RX ORDER — PREDNISONE 20 MG/1
4 TABLET ORAL
Refills: 0 | DISCHARGE
Start: 2024-11-15

## 2024-11-25 ENCOUNTER — INPATIENT (INPATIENT)
Facility: HOSPITAL | Age: 74
LOS: 6 days | Discharge: HOME HEALTH | DRG: 393 | End: 2024-12-02
Attending: STUDENT IN AN ORGANIZED HEALTH CARE EDUCATION/TRAINING PROGRAM | Admitting: INTERNAL MEDICINE
Payer: MEDICARE

## 2024-11-25 VITALS
SYSTOLIC BLOOD PRESSURE: 135 MMHG | OXYGEN SATURATION: 98 % | DIASTOLIC BLOOD PRESSURE: 119 MMHG | RESPIRATION RATE: 22 BRPM | HEART RATE: 128 BPM | WEIGHT: 291.67 LBS

## 2024-11-25 DIAGNOSIS — I48.91 UNSPECIFIED ATRIAL FIBRILLATION: ICD-10-CM

## 2024-11-25 DIAGNOSIS — Z90.89 ACQUIRED ABSENCE OF OTHER ORGANS: Chronic | ICD-10-CM

## 2024-11-25 DIAGNOSIS — Z98.890 OTHER SPECIFIED POSTPROCEDURAL STATES: Chronic | ICD-10-CM

## 2024-11-25 LAB
ALBUMIN SERPL ELPH-MCNC: 2.7 G/DL — LOW (ref 3.3–5)
ALP SERPL-CCNC: 58 U/L — SIGNIFICANT CHANGE UP (ref 40–120)
ALT FLD-CCNC: 16 U/L — SIGNIFICANT CHANGE UP (ref 12–78)
ANION GAP SERPL CALC-SCNC: 10 MMOL/L — SIGNIFICANT CHANGE UP (ref 5–17)
ANION GAP SERPL CALC-SCNC: 13 MMOL/L — SIGNIFICANT CHANGE UP (ref 5–17)
APPEARANCE UR: ABNORMAL
APTT BLD: 22.2 SEC — LOW (ref 24.5–35.6)
AST SERPL-CCNC: 17 U/L — SIGNIFICANT CHANGE UP (ref 15–37)
BACTERIA # UR AUTO: NEGATIVE /HPF — SIGNIFICANT CHANGE UP
BASOPHILS # BLD AUTO: 0.15 K/UL — SIGNIFICANT CHANGE UP (ref 0–0.2)
BASOPHILS NFR BLD AUTO: 0.6 % — SIGNIFICANT CHANGE UP (ref 0–2)
BILIRUB SERPL-MCNC: 0.5 MG/DL — SIGNIFICANT CHANGE UP (ref 0.2–1.2)
BILIRUB UR-MCNC: NEGATIVE — SIGNIFICANT CHANGE UP
BIZARRE PLATELETS BLD QL SMEAR: PRESENT — SIGNIFICANT CHANGE UP
BUN SERPL-MCNC: 46 MG/DL — HIGH (ref 7–23)
BUN SERPL-MCNC: 52 MG/DL — HIGH (ref 7–23)
BURR CELLS BLD QL SMEAR: PRESENT — SIGNIFICANT CHANGE UP
CALCIUM SERPL-MCNC: 8.4 MG/DL — LOW (ref 8.5–10.1)
CALCIUM SERPL-MCNC: 8.9 MG/DL — SIGNIFICANT CHANGE UP (ref 8.5–10.1)
CAST: 5 /LPF — SIGNIFICANT CHANGE UP (ref 0–4)
CHLORIDE SERPL-SCNC: 103 MMOL/L — SIGNIFICANT CHANGE UP (ref 96–108)
CHLORIDE SERPL-SCNC: 95 MMOL/L — LOW (ref 96–108)
CO2 SERPL-SCNC: 17 MMOL/L — LOW (ref 22–31)
CO2 SERPL-SCNC: 22 MMOL/L — SIGNIFICANT CHANGE UP (ref 22–31)
COLOR SPEC: SIGNIFICANT CHANGE UP
CREAT SERPL-MCNC: 3.04 MG/DL — HIGH (ref 0.5–1.3)
CREAT SERPL-MCNC: 4.38 MG/DL — HIGH (ref 0.5–1.3)
DIFF PNL FLD: ABNORMAL
EGFR: 13 ML/MIN/1.73M2 — LOW
EGFR: 21 ML/MIN/1.73M2 — LOW
EOSINOPHIL # BLD AUTO: 0.05 K/UL — SIGNIFICANT CHANGE UP (ref 0–0.5)
EOSINOPHIL NFR BLD AUTO: 0.2 % — SIGNIFICANT CHANGE UP (ref 0–6)
FLUAV AG NPH QL: SIGNIFICANT CHANGE UP
FLUBV AG NPH QL: SIGNIFICANT CHANGE UP
GLUCOSE SERPL-MCNC: 107 MG/DL — HIGH (ref 70–99)
GLUCOSE SERPL-MCNC: 150 MG/DL — HIGH (ref 70–99)
GLUCOSE UR QL: NEGATIVE MG/DL — SIGNIFICANT CHANGE UP
GRAN CASTS # UR COMP ASSIST: PRESENT
HCT VFR BLD CALC: 39.2 % — SIGNIFICANT CHANGE UP (ref 39–50)
HGB BLD-MCNC: 13.7 G/DL — SIGNIFICANT CHANGE UP (ref 13–17)
IMM GRANULOCYTES NFR BLD AUTO: 1.3 % — HIGH (ref 0–0.9)
INR BLD: 1.16 RATIO — SIGNIFICANT CHANGE UP (ref 0.85–1.16)
KETONES UR-MCNC: ABNORMAL MG/DL
LACTATE SERPL-SCNC: 3.2 MMOL/L — HIGH (ref 0.7–2)
LACTATE SERPL-SCNC: 3.4 MMOL/L — HIGH (ref 0.7–2)
LACTATE SERPL-SCNC: 4.9 MMOL/L — CRITICAL HIGH (ref 0.7–2)
LEUKOCYTE ESTERASE UR-ACNC: ABNORMAL
LYMPHOCYTES # BLD AUTO: 2.02 K/UL — SIGNIFICANT CHANGE UP (ref 1–3.3)
LYMPHOCYTES # BLD AUTO: 8.2 % — LOW (ref 13–44)
MANUAL SMEAR VERIFICATION: SIGNIFICANT CHANGE UP
MCHC RBC-ENTMCNC: 27.6 PG — SIGNIFICANT CHANGE UP (ref 27–34)
MCHC RBC-ENTMCNC: 34.9 G/DL — SIGNIFICANT CHANGE UP (ref 32–36)
MCV RBC AUTO: 78.9 FL — LOW (ref 80–100)
MICROCYTES BLD QL: SLIGHT — SIGNIFICANT CHANGE UP
MONOCYTES # BLD AUTO: 1.94 K/UL — HIGH (ref 0–0.9)
MONOCYTES NFR BLD AUTO: 7.9 % — SIGNIFICANT CHANGE UP (ref 2–14)
NEUTROPHILS # BLD AUTO: 20.02 K/UL — HIGH (ref 1.8–7.4)
NEUTROPHILS NFR BLD AUTO: 81.8 % — HIGH (ref 43–77)
NITRITE UR-MCNC: NEGATIVE — SIGNIFICANT CHANGE UP
PH UR: 5 — SIGNIFICANT CHANGE UP (ref 5–8)
PLAT MORPH BLD: NORMAL — SIGNIFICANT CHANGE UP
PLATELET # BLD AUTO: 256 K/UL — SIGNIFICANT CHANGE UP (ref 150–400)
POLYCHROMASIA BLD QL SMEAR: SLIGHT — SIGNIFICANT CHANGE UP
POTASSIUM SERPL-MCNC: 2.1 MMOL/L — CRITICAL LOW (ref 3.5–5.3)
POTASSIUM SERPL-MCNC: 2.2 MMOL/L — CRITICAL LOW (ref 3.5–5.3)
POTASSIUM SERPL-SCNC: 2.1 MMOL/L — CRITICAL LOW (ref 3.5–5.3)
POTASSIUM SERPL-SCNC: 2.2 MMOL/L — CRITICAL LOW (ref 3.5–5.3)
PROT SERPL-MCNC: 6.3 GM/DL — SIGNIFICANT CHANGE UP (ref 6–8.3)
PROT UR-MCNC: 100 MG/DL
PROTHROM AB SERPL-ACNC: 13.3 SEC — SIGNIFICANT CHANGE UP (ref 9.9–13.4)
RBC # BLD: 4.97 M/UL — SIGNIFICANT CHANGE UP (ref 4.2–5.8)
RBC # FLD: 13.5 % — SIGNIFICANT CHANGE UP (ref 10.3–14.5)
RBC BLD AUTO: ABNORMAL
RBC CASTS # UR COMP ASSIST: 472 /HPF — HIGH (ref 0–4)
RSV RNA NPH QL NAA+NON-PROBE: SIGNIFICANT CHANGE UP
SARS-COV-2 RNA SPEC QL NAA+PROBE: SIGNIFICANT CHANGE UP
SODIUM SERPL-SCNC: 130 MMOL/L — LOW (ref 135–145)
SODIUM SERPL-SCNC: 130 MMOL/L — LOW (ref 135–145)
SP GR SPEC: 1.02 — SIGNIFICANT CHANGE UP (ref 1–1.03)
SQUAMOUS # UR AUTO: 10 /HPF — HIGH (ref 0–5)
TROPONIN I, HIGH SENSITIVITY RESULT: 127.38 NG/L — HIGH
UROBILINOGEN FLD QL: 0.2 MG/DL — SIGNIFICANT CHANGE UP (ref 0.2–1)
WBC # BLD: 24.49 K/UL — HIGH (ref 3.8–10.5)
WBC # FLD AUTO: 24.49 K/UL — HIGH (ref 3.8–10.5)
WBC UR QL: 9 /HPF — HIGH (ref 0–5)

## 2024-11-25 PROCEDURE — 86480 TB TEST CELL IMMUN MEASURE: CPT

## 2024-11-25 PROCEDURE — 71250 CT THORAX DX C-: CPT | Mod: 26,MC

## 2024-11-25 PROCEDURE — 80053 COMPREHEN METABOLIC PANEL: CPT

## 2024-11-25 PROCEDURE — 85027 COMPLETE CBC AUTOMATED: CPT

## 2024-11-25 PROCEDURE — 83735 ASSAY OF MAGNESIUM: CPT

## 2024-11-25 PROCEDURE — 87045 FECES CULTURE AEROBIC BACT: CPT

## 2024-11-25 PROCEDURE — 72125 CT NECK SPINE W/O DYE: CPT | Mod: 26,MC

## 2024-11-25 PROCEDURE — 96376 TX/PRO/DX INJ SAME DRUG ADON: CPT

## 2024-11-25 PROCEDURE — 83605 ASSAY OF LACTIC ACID: CPT

## 2024-11-25 PROCEDURE — 96375 TX/PRO/DX INJ NEW DRUG ADDON: CPT

## 2024-11-25 PROCEDURE — 93010 ELECTROCARDIOGRAM REPORT: CPT

## 2024-11-25 PROCEDURE — 84100 ASSAY OF PHOSPHORUS: CPT

## 2024-11-25 PROCEDURE — 36415 COLL VENOUS BLD VENIPUNCTURE: CPT

## 2024-11-25 PROCEDURE — 81001 URINALYSIS AUTO W/SCOPE: CPT

## 2024-11-25 PROCEDURE — 99285 EMERGENCY DEPT VISIT HI MDM: CPT | Mod: 25

## 2024-11-25 PROCEDURE — 97116 GAIT TRAINING THERAPY: CPT | Mod: GP

## 2024-11-25 PROCEDURE — 97162 PT EVAL MOD COMPLEX 30 MIN: CPT | Mod: GP

## 2024-11-25 PROCEDURE — 87640 STAPH A DNA AMP PROBE: CPT

## 2024-11-25 PROCEDURE — 70450 CT HEAD/BRAIN W/O DYE: CPT | Mod: 26,MC

## 2024-11-25 PROCEDURE — 80048 BASIC METABOLIC PNL TOTAL CA: CPT

## 2024-11-25 PROCEDURE — 70553 MRI BRAIN STEM W/O & W/DYE: CPT | Mod: MC

## 2024-11-25 PROCEDURE — 93356 MYOCRD STRAIN IMG SPCKL TRCK: CPT

## 2024-11-25 PROCEDURE — 82962 GLUCOSE BLOOD TEST: CPT

## 2024-11-25 PROCEDURE — 87507 IADNA-DNA/RNA PROBE TQ 12-25: CPT

## 2024-11-25 PROCEDURE — 71045 X-RAY EXAM CHEST 1 VIEW: CPT | Mod: 26

## 2024-11-25 PROCEDURE — 93306 TTE W/DOPPLER COMPLETE: CPT

## 2024-11-25 PROCEDURE — 87046 STOOL CULTR AEROBIC BACT EA: CPT

## 2024-11-25 PROCEDURE — A9579: CPT

## 2024-11-25 PROCEDURE — 87641 MR-STAPH DNA AMP PROBE: CPT

## 2024-11-25 PROCEDURE — 99291 CRITICAL CARE FIRST HOUR: CPT

## 2024-11-25 PROCEDURE — 96374 THER/PROPH/DIAG INJ IV PUSH: CPT

## 2024-11-25 PROCEDURE — 74176 CT ABD & PELVIS W/O CONTRAST: CPT | Mod: 26,MC

## 2024-11-25 RX ORDER — SODIUM CHLORIDE 9 MG/ML
1000 INJECTION, SOLUTION INTRAMUSCULAR; INTRAVENOUS; SUBCUTANEOUS ONCE
Refills: 0 | Status: COMPLETED | OUTPATIENT
Start: 2024-11-25 | End: 2024-11-25

## 2024-11-25 RX ORDER — POTASSIUM CHLORIDE 600 MG/1
10 TABLET, EXTENDED RELEASE ORAL
Refills: 0 | Status: COMPLETED | OUTPATIENT
Start: 2024-11-25 | End: 2024-11-26

## 2024-11-25 RX ORDER — 0.9 % SODIUM CHLORIDE 0.9 %
1000 INTRAVENOUS SOLUTION INTRAVENOUS ONCE
Refills: 0 | Status: COMPLETED | OUTPATIENT
Start: 2024-11-25 | End: 2024-11-25

## 2024-11-25 RX ORDER — PIPERACILLIN SODIUM AND TAZOBACTAM SODIUM 4; .5 G/20ML; G/20ML
3.38 INJECTION, POWDER, LYOPHILIZED, FOR SOLUTION INTRAVENOUS ONCE
Refills: 0 | Status: COMPLETED | OUTPATIENT
Start: 2024-11-25 | End: 2024-11-25

## 2024-11-25 RX ORDER — PIPERACILLIN SODIUM AND TAZOBACTAM SODIUM 4; .5 G/20ML; G/20ML
3.38 INJECTION, POWDER, LYOPHILIZED, FOR SOLUTION INTRAVENOUS ONCE
Refills: 0 | Status: COMPLETED | OUTPATIENT
Start: 2024-11-26 | End: 2024-11-26

## 2024-11-25 RX ORDER — VANCOMYCIN HCL 900 MCG/MG
1500 POWDER (GRAM) MISCELLANEOUS ONCE
Refills: 0 | Status: COMPLETED | OUTPATIENT
Start: 2024-11-25 | End: 2024-11-26

## 2024-11-25 RX ORDER — DILTIAZEM HYDROCHLORIDE 240 MG/1
10 CAPSULE, COATED, EXTENDED RELEASE ORAL ONCE
Refills: 0 | Status: COMPLETED | OUTPATIENT
Start: 2024-11-25 | End: 2024-11-25

## 2024-11-25 RX ORDER — NOREPINEPHRINE BITARTRATE 1 MG/ML
0.05 INJECTION, SOLUTION, CONCENTRATE INTRAVENOUS
Qty: 8 | Refills: 0 | Status: DISCONTINUED | OUTPATIENT
Start: 2024-11-25 | End: 2024-11-27

## 2024-11-25 RX ORDER — PIPERACILLIN SODIUM AND TAZOBACTAM SODIUM 4; .5 G/20ML; G/20ML
3.38 INJECTION, POWDER, LYOPHILIZED, FOR SOLUTION INTRAVENOUS EVERY 12 HOURS
Refills: 0 | Status: DISCONTINUED | OUTPATIENT
Start: 2024-11-26 | End: 2024-11-26

## 2024-11-25 RX ORDER — ROSUVASTATIN CALCIUM 5 MG/1
10 TABLET, FILM COATED ORAL AT BEDTIME
Refills: 0 | Status: DISCONTINUED | OUTPATIENT
Start: 2024-11-25 | End: 2024-12-02

## 2024-11-25 RX ORDER — 0.9 % SODIUM CHLORIDE 0.9 %
1000 INTRAVENOUS SOLUTION INTRAVENOUS
Refills: 0 | Status: COMPLETED | OUTPATIENT
Start: 2024-11-25 | End: 2024-11-26

## 2024-11-25 RX ORDER — CEFTRIAXONE SODIUM 1 G
1000 VIAL (EA) INJECTION ONCE
Refills: 0 | Status: COMPLETED | OUTPATIENT
Start: 2024-11-25 | End: 2024-11-25

## 2024-11-25 RX ORDER — POTASSIUM CHLORIDE 600 MG/1
10 TABLET, EXTENDED RELEASE ORAL
Refills: 0 | Status: COMPLETED | OUTPATIENT
Start: 2024-11-25 | End: 2024-11-25

## 2024-11-25 RX ORDER — POTASSIUM CHLORIDE 600 MG/1
40 TABLET, EXTENDED RELEASE ORAL EVERY 4 HOURS
Refills: 0 | Status: DISCONTINUED | OUTPATIENT
Start: 2024-11-25 | End: 2024-11-26

## 2024-11-25 RX ORDER — SODIUM CHLORIDE 9 MG/ML
2000 INJECTION, SOLUTION INTRAMUSCULAR; INTRAVENOUS; SUBCUTANEOUS ONCE
Refills: 0 | Status: COMPLETED | OUTPATIENT
Start: 2024-11-25 | End: 2024-11-25

## 2024-11-25 RX ORDER — HEPARIN SODIUM,PORCINE 1000/ML
5000 VIAL (ML) INJECTION EVERY 12 HOURS
Refills: 0 | Status: DISCONTINUED | OUTPATIENT
Start: 2024-11-25 | End: 2024-11-25

## 2024-11-25 RX ORDER — CHLORHEXIDINE GLUCONATE 1.2 MG/ML
1 RINSE ORAL
Refills: 0 | Status: DISCONTINUED | OUTPATIENT
Start: 2024-11-25 | End: 2024-12-02

## 2024-11-25 RX ADMIN — Medication 1000 MILLIGRAM(S): at 17:26

## 2024-11-25 RX ADMIN — POTASSIUM CHLORIDE 100 MILLIEQUIVALENT(S): 600 TABLET, EXTENDED RELEASE ORAL at 20:53

## 2024-11-25 RX ADMIN — SODIUM CHLORIDE 1000 MILLILITER(S): 9 INJECTION, SOLUTION INTRAMUSCULAR; INTRAVENOUS; SUBCUTANEOUS at 18:30

## 2024-11-25 RX ADMIN — Medication 1000 MILLILITER(S): at 20:54

## 2024-11-25 RX ADMIN — SODIUM CHLORIDE 2000 MILLILITER(S): 9 INJECTION, SOLUTION INTRAMUSCULAR; INTRAVENOUS; SUBCUTANEOUS at 17:12

## 2024-11-25 RX ADMIN — NOREPINEPHRINE BITARTRATE 12.4 MICROGRAM(S)/KG/MIN: 1 INJECTION, SOLUTION, CONCENTRATE INTRAVENOUS at 19:11

## 2024-11-25 RX ADMIN — Medication 200 MILLILITER(S): at 22:53

## 2024-11-25 RX ADMIN — PIPERACILLIN SODIUM AND TAZOBACTAM SODIUM 200 GRAM(S): 4; .5 INJECTION, POWDER, LYOPHILIZED, FOR SOLUTION INTRAVENOUS at 22:53

## 2024-11-25 RX ADMIN — POTASSIUM CHLORIDE 40 MILLIEQUIVALENT(S): 600 TABLET, EXTENDED RELEASE ORAL at 19:30

## 2024-11-25 RX ADMIN — SODIUM CHLORIDE 1000 MILLILITER(S): 9 INJECTION, SOLUTION INTRAMUSCULAR; INTRAVENOUS; SUBCUTANEOUS at 18:00

## 2024-11-25 RX ADMIN — ROSUVASTATIN CALCIUM 10 MILLIGRAM(S): 5 TABLET, FILM COATED ORAL at 22:52

## 2024-11-25 RX ADMIN — POTASSIUM CHLORIDE 40 MILLIEQUIVALENT(S): 600 TABLET, EXTENDED RELEASE ORAL at 22:53

## 2024-11-25 RX ADMIN — POTASSIUM CHLORIDE 100 MILLIEQUIVALENT(S): 600 TABLET, EXTENDED RELEASE ORAL at 18:08

## 2024-11-25 RX ADMIN — POTASSIUM CHLORIDE 100 MILLIEQUIVALENT(S): 600 TABLET, EXTENDED RELEASE ORAL at 19:26

## 2024-11-25 NOTE — ED ADULT NURSE REASSESSMENT NOTE - NS ED NURSE REASSESS COMMENT FT1
Martinez catheter placed as per MD Fleming, no output. Bladder scanned 34 mL MD Fleming notified. Pt changed into a clean diaper. Pt tolerated procedure well.

## 2024-11-25 NOTE — ED PROVIDER NOTE - NSICDXFAMILYHX_GEN_ALL_CORE_FT
FAMILY HISTORY:  Father  Still living? No  Family history of lung cancer, Age at diagnosis: Age Unknown    Mother  Still living? No  Family history of bone cancer, Age at diagnosis: Age Unknown

## 2024-11-25 NOTE — ED ADULT NURSE REASSESSMENT NOTE - NS ED NURSE REASSESS COMMENT FT1
Pt persistently tachycardiac and hypotensive at this time. MD Fleming aware Fluids continue to be infused as per MD orders. Still awaiting ICU consult. Pt mentating at baseline, offers no complaints

## 2024-11-25 NOTE — ED PROVIDER NOTE - CRITICAL CARE ATTENDING CONTRIBUTION TO CARE
Critical care time spent evaluating and reevaluating patient, ordering and interpreting diagnostics, ordering therapeutics, speaking to pt, family and admitting MD, reviewing old records and documenting. Bernadette LOONEY

## 2024-11-25 NOTE — ED ADULT NURSE REASSESSMENT NOTE - NS ED NURSE REASSESS COMMENT FT1
Pt hypotensive however mentating at baseline denies any complaints. MD Fleming aware Fluids continued to be infused as ordered. Pending ICU consult.

## 2024-11-25 NOTE — H&P ADULT - ASSESSMENT
A:    74yMale  HD #    Here for:    1. Shock, POA  2. Hypotension  3. ? sepsis  4. NEEL  5. MSOF  6. Hypokalemia    This patient requires critical care for support of one or more vital organ systems with a high probability of imminent or life threatening deterioration in his/her condition    P:    Shock, hypovolemic +/- septic  In setting of metastatic melanoma on checkpoint inhibitor, heavy diarrhea for 1 month, has not been able to keep up with PO intake    Sepsis bundle  f/u Cx's, white count, fever curve  Broad spectrum abx  Oncology consult  HD monitoring, ? new onset AF; Cards consult, will order echo  IS, OOB as able  NEEL, suspect pre renal + ATN; fonseca for I/O's, avoid renal toxic agents  Renal, cards, heme/onc consults  Replete K+ aggressively  Continue IVF w/ KCL in bag  VTE ppx  PIV access, minimze invasive catheters  May require rectal tube device      Dispo: Cont critical care.    Date of entry of this note is equal to the date of services rendered    TOTAL CRITICAL CARE TIME:  110 minutes (EXCLUSIVE of any non bundled procedures)    Note: This is a critically ill patient. Time spent has been in salvage of life, limb and vital organ systems. This time INCLUDES time spent directly as this patient's bedside with evaluation and management, review of chart including review of laboratory and imaging studies, interpretation of vital signs and cardiac output measurements, any necessary ventilator management, and time spent discussing plan of care with patient and family, including goals of care discussion. This also includes time spent in multidisciplinary discussion with care team and various consultants to optimize treatment plan. This time may NOT include various procedures, which will be noted seperately.

## 2024-11-25 NOTE — H&P ADULT - HISTORY OF PRESENT ILLNESS
ICU Admit Note    S:    Pt seen and examined  75 y/o M with PMHx of aortic aneurysm, chronic lower back pain, HLD, HTN, OA, metastatic melanoma on Biologics presents to the ED c/o diarrhea x1 month, severe at times having more than 10 bms per today. Vomited yesterday, and became weak having 3 falls injuring his head. Today pt was so weak he couldn't get up, so he called EMS. EMS found him to be hypotensive, awake, and alert. Pt also noted that he passed out today. BIB EMS, hypotensive with rpaid heart beat. PT with no chest pain or SOB, no pain anywhere    ICU consult for MSOF, hypotension    11/25: NEEL, hypotensive despite aggressive fluid resuscitation, on pressors, sepsis bundle, w/u in progress    ROS: + weakness, diarrhea

## 2024-11-25 NOTE — ED ADULT NURSE REASSESSMENT NOTE - NS ED NURSE REASSESS COMMENT FT1
MD Fleming aware of HR. Fluids infusing as per MD order. Pt A&OX4 and offers no complaints at this time.

## 2024-11-25 NOTE — ED PROVIDER NOTE - PROGRESS NOTE DETAILS
Pt improved after fluids hr dec to approx 110 bp 107/60. Spoke with nephew and ramona who state pt has five brain lesions and recently received radiation. Oncologist Dr. Rasmussen at [facility]. pt is in arf. Juan Fleming MD Filemon Walker   Spoke to ICU PA who will come down and see the pt. Filemon Walker   Spoke to ICU PA, will start pt on Levophed

## 2024-11-25 NOTE — ED PROVIDER NOTE - OBJECTIVE STATEMENT
73 y/o M with PMHx of aortic aneurysm, chronic lower back pain, HLD, HTN, OA, metastatic melanoma on Biologics presents to the ED c/o diarrhea x1 month, severe at times having more than 10 bms per today. Vomited yesterday, and became weak having 3 falls injuring his head. Today pt was so weak he couldn't get up, so he called EMS. EMS found him to be hypotensive, awake, and alert. Pt also noted that he passed out today. BIB EMS, hypotensive with rpaid heart beat. PT with no chest pain or SOB, no pain anywhere. 73 y/o M with PMHx of aortic aneurysm, chronic lower back pain, HLD, HTN, OA, metastatic melanoma on Biologics presents to the ED c/o diarrhea x1 month, severe at times having more than 10 bms per today. Vomited yesterday, and became weak having 3 falls injuring his head. Today pt was so weak he couldn't get up, so he called EMS. EMS found him to be hypotensive, awake, and alert. Pt also noted that he passed out today. BIB EMS, hypotensive with rapid heart beat. PT with no chest pain or SOB, no pain anywhere.

## 2024-11-25 NOTE — ED PROVIDER NOTE - CARE PLAN
Principal Discharge DX:	Rapid atrial fibrillation  Secondary Diagnosis:	Hypotension  Secondary Diagnosis:	Syncope  Secondary Diagnosis:	Melanoma metastatic to brain  Secondary Diagnosis:	Acute renal failure (ARF)  Secondary Diagnosis:	Hypokalemia  Secondary Diagnosis:	Acute UTI   1

## 2024-11-25 NOTE — H&P ADULT - NSHPLABSRESULTS_GEN_ALL_CORE
LABS:    CBC Full  -  ( 25 Nov 2024 16:52 )  WBC Count : 24.49 K/uL  RBC Count : 4.97 M/uL  Hemoglobin : 13.7 g/dL  Hematocrit : 39.2 %  Platelet Count - Automated : 256 K/uL  Mean Cell Volume : 78.9 fl  Mean Cell Hemoglobin : 27.6 pg  Mean Cell Hemoglobin Concentration : 34.9 g/dL  Auto Neutrophil # : 20.02 K/uL  Auto Lymphocyte # : 2.02 K/uL  Auto Monocyte # : 1.94 K/uL  Auto Eosinophil # : 0.05 K/uL  Auto Basophil # : 0.15 K/uL  Auto Neutrophil % : 81.8 %  Auto Lymphocyte % : 8.2 %  Auto Monocyte % : 7.9 %  Auto Eosinophil % : 0.2 %  Auto Basophil % : 0.6 %    11-25    130[L]  |  95[L]  |  52[H]  ----------------------------<  150[H]  2.2[LL]   |  22  |  4.38[H]    Ca    8.9      25 Nov 2024 16:52  Mg     2.9     11-25    TPro  6.3  /  Alb  2.7[L]  /  TBili  0.5  /  DBili  x   /  AST  17  /  ALT  16  /  AlkPhos  58  11-25    PT/INR - ( 25 Nov 2024 16:52 )   PT: 13.3 sec;   INR: 1.16 ratio         PTT - ( 25 Nov 2024 16:52 )  PTT:22.2 sec  Urinalysis Basic - ( 25 Nov 2024 16:52 )    Color: x / Appearance: x / SG: x / pH: x  Gluc: 150 mg/dL / Ketone: x  / Bili: x / Urobili: x   Blood: x / Protein: x / Nitrite: x   Leuk Esterase: x / RBC: x / WBC x   Sq Epi: x / Non Sq Epi: x / Bacteria: x      CAPILLARY BLOOD GLUCOSE            LIVER FUNCTIONS - ( 25 Nov 2024 16:52 )  Alb: 2.7 g/dL / Pro: 6.3 gm/dL / ALK PHOS: 58 U/L / ALT: 16 U/L / AST: 17 U/L / GGT: x               A:

## 2024-11-25 NOTE — ED PROVIDER NOTE - CONSTITUTIONAL, MLM
normal... flushed, awake, alert, oriented to person, place, time/situation and in no apparent distress.

## 2024-11-25 NOTE — ED ADULT NURSE REASSESSMENT NOTE - NS ED NURSE REASSESS COMMENT FT1
Received report from nichelle Aden. Pt A&OX4. Fluids infusing as per MD order. Awaiting CT scan. Pt in no acute distress.

## 2024-11-25 NOTE — ED ADULT TRIAGE NOTE - CHIEF COMPLAINT QUOTE
Ambulance to ER with c/o weakness x 3 days; hx of stage 4 melanoma. Patient hypotensive upon EMS arrival BP 55/31; /119 in triage.

## 2024-11-25 NOTE — ED ADULT NURSE NOTE - NSFALLRISKINTERV_ED_ALL_ED
Assistance OOB with selected safe patient handling equipment if applicable/Assistance with ambulation/Communicate fall risk and risk factors to all staff, patient, and family/Monitor gait and stability/Provide visual cue: yellow wristband, yellow gown, etc/Reinforce activity limits and safety measures with patient and family/Call bell, personal items and telephone in reach/Instruct patient to call for assistance before getting out of bed/chair/stretcher/Non-slip footwear applied when patient is off stretcher/[place] to call system/Physically safe environment - no spills, clutter or unnecessary equipment/Purposeful Proactive Rounding/Room/bathroom lighting operational, light cord in reach

## 2024-11-25 NOTE — ED ADULT NURSE NOTE - OBJECTIVE STATEMENT
75 y/o M presenting to ER with c/o weakness for a few days. denies recent sick contacts or fevers. hx of stage 4 melanoma, colon CA and brain lesions. received first dose of immunotherapy x1-2 weeks ago. patient hypotensive with EMS as per EMS. in ED patient is found to be in rapid a fib and hypotensive SBP 20's. patient has also been experiencing diarrhea over the past few days. two large bore peripheral IV lines placed bilaterally, blood work obtained and sent. on cardiac monitor. patient at baseline mental status, A&OX3. patient was due to begin treatments for "GI symptoms" as per granddaughter at [place] today. patient placed in cervical collar awaiting CT scans for multiple recent falls.

## 2024-11-25 NOTE — ED PROVIDER NOTE - SKIN, MLM
Skin normal color for race, warm, dry and intact. No evidence of rash. questionable erythema to R temple

## 2024-11-25 NOTE — ED PROVIDER NOTE - CLINICAL SUMMARY MEDICAL DECISION MAKING FREE TEXT BOX
Pt with hx of HTN, melanoma here with diarrhea x1 month, weakness since yesterday. Pt found to be hypotensive and in rapid afib. Will give IVF and give Cardizem to slow his rate. Pt with hx of HTN, melanoma here with diarrhea x1 month, weakness since yesterday. Pt found to be hypotensive and in rapid afib. Will give IVF and give Cardizem to slow his rate.Pt improved with ivf but found to be in renal failure. Ct head cspine chest abd pelvis, esther ntibiotics, icu  pressors.

## 2024-11-26 ENCOUNTER — RESULT REVIEW (OUTPATIENT)
Age: 74
End: 2024-11-26

## 2024-11-26 LAB
ALBUMIN SERPL ELPH-MCNC: 2.1 G/DL — LOW (ref 3.3–5)
ALP SERPL-CCNC: 53 U/L — SIGNIFICANT CHANGE UP (ref 40–120)
ALT FLD-CCNC: 16 U/L — SIGNIFICANT CHANGE UP (ref 12–78)
ANION GAP SERPL CALC-SCNC: 6 MMOL/L — SIGNIFICANT CHANGE UP (ref 5–17)
ANION GAP SERPL CALC-SCNC: 8 MMOL/L — SIGNIFICANT CHANGE UP (ref 5–17)
ANION GAP SERPL CALC-SCNC: 8 MMOL/L — SIGNIFICANT CHANGE UP (ref 5–17)
AST SERPL-CCNC: 17 U/L — SIGNIFICANT CHANGE UP (ref 15–37)
BILIRUB SERPL-MCNC: 0.5 MG/DL — SIGNIFICANT CHANGE UP (ref 0.2–1.2)
BUN SERPL-MCNC: 30 MG/DL — HIGH (ref 7–23)
BUN SERPL-MCNC: 36 MG/DL — HIGH (ref 7–23)
BUN SERPL-MCNC: 41 MG/DL — HIGH (ref 7–23)
CALCIUM SERPL-MCNC: 7.8 MG/DL — LOW (ref 8.5–10.1)
CALCIUM SERPL-MCNC: 8 MG/DL — LOW (ref 8.5–10.1)
CALCIUM SERPL-MCNC: 8.3 MG/DL — LOW (ref 8.5–10.1)
CHLORIDE SERPL-SCNC: 103 MMOL/L — SIGNIFICANT CHANGE UP (ref 96–108)
CHLORIDE SERPL-SCNC: 104 MMOL/L — SIGNIFICANT CHANGE UP (ref 96–108)
CHLORIDE SERPL-SCNC: 105 MMOL/L — SIGNIFICANT CHANGE UP (ref 96–108)
CO2 SERPL-SCNC: 19 MMOL/L — LOW (ref 22–31)
CO2 SERPL-SCNC: 20 MMOL/L — LOW (ref 22–31)
CO2 SERPL-SCNC: 21 MMOL/L — LOW (ref 22–31)
CREAT SERPL-MCNC: 1.66 MG/DL — HIGH (ref 0.5–1.3)
CREAT SERPL-MCNC: 1.97 MG/DL — HIGH (ref 0.5–1.3)
CREAT SERPL-MCNC: 2.36 MG/DL — HIGH (ref 0.5–1.3)
EGFR: 28 ML/MIN/1.73M2 — LOW
EGFR: 35 ML/MIN/1.73M2 — LOW
EGFR: 43 ML/MIN/1.73M2 — LOW
GI PCR PANEL: SIGNIFICANT CHANGE UP
GLUCOSE SERPL-MCNC: 110 MG/DL — HIGH (ref 70–99)
GLUCOSE SERPL-MCNC: 132 MG/DL — HIGH (ref 70–99)
GLUCOSE SERPL-MCNC: 147 MG/DL — HIGH (ref 70–99)
HCT VFR BLD CALC: 37.3 % — LOW (ref 39–50)
HGB BLD-MCNC: 12.7 G/DL — LOW (ref 13–17)
LACTATE SERPL-SCNC: 3.7 MMOL/L — HIGH (ref 0.7–2)
MAGNESIUM SERPL-MCNC: 2.2 MG/DL — SIGNIFICANT CHANGE UP (ref 1.6–2.6)
MCHC RBC-ENTMCNC: 26.9 PG — LOW (ref 27–34)
MCHC RBC-ENTMCNC: 34 G/DL — SIGNIFICANT CHANGE UP (ref 32–36)
MCV RBC AUTO: 79 FL — LOW (ref 80–100)
PHOSPHATE SERPL-MCNC: 2.3 MG/DL — LOW (ref 2.5–4.5)
PLATELET # BLD AUTO: 258 K/UL — SIGNIFICANT CHANGE UP (ref 150–400)
POTASSIUM SERPL-MCNC: 2.8 MMOL/L — CRITICAL LOW (ref 3.5–5.3)
POTASSIUM SERPL-MCNC: 2.8 MMOL/L — CRITICAL LOW (ref 3.5–5.3)
POTASSIUM SERPL-MCNC: 3.1 MMOL/L — LOW (ref 3.5–5.3)
POTASSIUM SERPL-SCNC: 2.8 MMOL/L — CRITICAL LOW (ref 3.5–5.3)
POTASSIUM SERPL-SCNC: 2.8 MMOL/L — CRITICAL LOW (ref 3.5–5.3)
POTASSIUM SERPL-SCNC: 3.1 MMOL/L — LOW (ref 3.5–5.3)
PROT SERPL-MCNC: 5.2 GM/DL — LOW (ref 6–8.3)
RBC # BLD: 4.72 M/UL — SIGNIFICANT CHANGE UP (ref 4.2–5.8)
RBC # FLD: 13.4 % — SIGNIFICANT CHANGE UP (ref 10.3–14.5)
SODIUM SERPL-SCNC: 130 MMOL/L — LOW (ref 135–145)
SODIUM SERPL-SCNC: 132 MMOL/L — LOW (ref 135–145)
SODIUM SERPL-SCNC: 132 MMOL/L — LOW (ref 135–145)
WBC # BLD: 21.59 K/UL — HIGH (ref 3.8–10.5)
WBC # FLD AUTO: 21.59 K/UL — HIGH (ref 3.8–10.5)

## 2024-11-26 PROCEDURE — 93356 MYOCRD STRAIN IMG SPCKL TRCK: CPT

## 2024-11-26 PROCEDURE — 93306 TTE W/DOPPLER COMPLETE: CPT | Mod: 26

## 2024-11-26 PROCEDURE — 99222 1ST HOSP IP/OBS MODERATE 55: CPT

## 2024-11-26 PROCEDURE — 99223 1ST HOSP IP/OBS HIGH 75: CPT

## 2024-11-26 PROCEDURE — 99291 CRITICAL CARE FIRST HOUR: CPT

## 2024-11-26 RX ORDER — INFLIXIMAB 100 MG/10ML
660 INJECTION, POWDER, LYOPHILIZED, FOR SOLUTION INTRAVENOUS ONCE
Refills: 0 | Status: COMPLETED | OUTPATIENT
Start: 2024-11-26 | End: 2024-11-27

## 2024-11-26 RX ORDER — 0.9 % SODIUM CHLORIDE 0.9 %
1000 INTRAVENOUS SOLUTION INTRAVENOUS
Refills: 0 | Status: DISCONTINUED | OUTPATIENT
Start: 2024-11-26 | End: 2024-11-29

## 2024-11-26 RX ORDER — METHYLPREDNISOLONE SOD SUCC 125 MG
125 VIAL (EA) INJECTION DAILY
Refills: 0 | Status: DISCONTINUED | OUTPATIENT
Start: 2024-11-26 | End: 2024-12-02

## 2024-11-26 RX ORDER — 0.9 % SODIUM CHLORIDE 0.9 %
1000 INTRAVENOUS SOLUTION INTRAVENOUS ONCE
Refills: 0 | Status: COMPLETED | OUTPATIENT
Start: 2024-11-26 | End: 2024-11-26

## 2024-11-26 RX ORDER — DIPHENHYDRAMINE HCL 25 MG
25 CAPSULE ORAL ONCE
Refills: 0 | Status: COMPLETED | OUTPATIENT
Start: 2024-11-27 | End: 2024-11-27

## 2024-11-26 RX ORDER — PANTOPRAZOLE SODIUM 40 MG/1
40 TABLET, DELAYED RELEASE ORAL
Refills: 0 | Status: DISCONTINUED | OUTPATIENT
Start: 2024-11-26 | End: 2024-12-02

## 2024-11-26 RX ORDER — POTASSIUM CHLORIDE 600 MG/1
40 TABLET, EXTENDED RELEASE ORAL ONCE
Refills: 0 | Status: COMPLETED | OUTPATIENT
Start: 2024-11-26 | End: 2024-11-26

## 2024-11-26 RX ORDER — POTASSIUM CHLORIDE 600 MG/1
10 TABLET, EXTENDED RELEASE ORAL
Refills: 0 | Status: DISCONTINUED | OUTPATIENT
Start: 2024-11-26 | End: 2024-11-26

## 2024-11-26 RX ORDER — POTASSIUM CHLORIDE 600 MG/1
10 TABLET, EXTENDED RELEASE ORAL
Refills: 0 | Status: COMPLETED | OUTPATIENT
Start: 2024-11-26 | End: 2024-11-26

## 2024-11-26 RX ORDER — PIPERACILLIN SODIUM AND TAZOBACTAM SODIUM 4; .5 G/20ML; G/20ML
3.38 INJECTION, POWDER, LYOPHILIZED, FOR SOLUTION INTRAVENOUS EVERY 8 HOURS
Refills: 0 | Status: DISCONTINUED | OUTPATIENT
Start: 2024-11-26 | End: 2024-11-27

## 2024-11-26 RX ORDER — POTASSIUM PHOSPHATE, MONOBASIC POTASSIUM PHOSPHATE, DIBASIC INJECTION, 236; 224 MG/ML; MG/ML
30 SOLUTION, CONCENTRATE INTRAVENOUS ONCE
Refills: 0 | Status: DISCONTINUED | OUTPATIENT
Start: 2024-11-26 | End: 2024-11-26

## 2024-11-26 RX ORDER — DILTIAZEM HYDROCHLORIDE 240 MG/1
10 CAPSULE, COATED, EXTENDED RELEASE ORAL ONCE
Refills: 0 | Status: COMPLETED | OUTPATIENT
Start: 2024-11-26 | End: 2024-11-26

## 2024-11-26 RX ORDER — POTASSIUM PHOSPHATE, MONOBASIC POTASSIUM PHOSPHATE, DIBASIC INJECTION, 236; 224 MG/ML; MG/ML
30 SOLUTION, CONCENTRATE INTRAVENOUS ONCE
Refills: 0 | Status: COMPLETED | OUTPATIENT
Start: 2024-11-26 | End: 2024-11-26

## 2024-11-26 RX ORDER — POTASSIUM CHLORIDE 600 MG/1
20 TABLET, EXTENDED RELEASE ORAL EVERY 4 HOURS
Refills: 0 | Status: COMPLETED | OUTPATIENT
Start: 2024-11-26 | End: 2024-11-26

## 2024-11-26 RX ORDER — ACETAMINOPHEN 500MG 500 MG/1
650 TABLET, COATED ORAL EVERY 6 HOURS
Refills: 0 | Status: DISCONTINUED | OUTPATIENT
Start: 2024-11-26 | End: 2024-12-02

## 2024-11-26 RX ORDER — METHYLPREDNISOLONE SOD SUCC 125 MG
130 VIAL (EA) INJECTION DAILY
Refills: 0 | Status: DISCONTINUED | OUTPATIENT
Start: 2024-11-26 | End: 2024-11-26

## 2024-11-26 RX ORDER — SODIUM,POTASSIUM PHOSPHATES 278-250MG
2 POWDER IN PACKET (EA) ORAL
Refills: 0 | Status: COMPLETED | OUTPATIENT
Start: 2024-11-26 | End: 2024-11-26

## 2024-11-26 RX ORDER — ACETAMINOPHEN 500MG 500 MG/1
650 TABLET, COATED ORAL ONCE
Refills: 0 | Status: COMPLETED | OUTPATIENT
Start: 2024-11-27 | End: 2024-11-27

## 2024-11-26 RX ORDER — PHENYLEPHRINE HYDROCHLORIDE 10 MG/ML
0.3 INJECTION INTRAVENOUS
Qty: 40 | Refills: 0 | Status: DISCONTINUED | OUTPATIENT
Start: 2024-11-26 | End: 2024-11-27

## 2024-11-26 RX ORDER — METOPROLOL TARTRATE 100 MG/1
2.5 TABLET, FILM COATED ORAL EVERY 6 HOURS
Refills: 0 | Status: DISCONTINUED | OUTPATIENT
Start: 2024-11-26 | End: 2024-11-28

## 2024-11-26 RX ADMIN — DILTIAZEM HYDROCHLORIDE 10 MILLIGRAM(S): 240 CAPSULE, COATED, EXTENDED RELEASE ORAL at 17:23

## 2024-11-26 RX ADMIN — DILTIAZEM HYDROCHLORIDE 10 MILLIGRAM(S): 240 CAPSULE, COATED, EXTENDED RELEASE ORAL at 09:00

## 2024-11-26 RX ADMIN — Medication 1000 MILLILITER(S): at 03:52

## 2024-11-26 RX ADMIN — Medication 300 MILLIGRAM(S): at 02:45

## 2024-11-26 RX ADMIN — POTASSIUM CHLORIDE 20 MILLIEQUIVALENT(S): 600 TABLET, EXTENDED RELEASE ORAL at 21:41

## 2024-11-26 RX ADMIN — PHENYLEPHRINE HYDROCHLORIDE 14.9 MICROGRAM(S)/KG/MIN: 10 INJECTION INTRAVENOUS at 03:45

## 2024-11-26 RX ADMIN — POTASSIUM CHLORIDE 100 MILLIEQUIVALENT(S): 600 TABLET, EXTENDED RELEASE ORAL at 15:39

## 2024-11-26 RX ADMIN — Medication 2 PACKET(S): at 09:23

## 2024-11-26 RX ADMIN — Medication 125 MILLIGRAM(S): at 12:58

## 2024-11-26 RX ADMIN — PHENYLEPHRINE HYDROCHLORIDE 14.9 MICROGRAM(S)/KG/MIN: 10 INJECTION INTRAVENOUS at 09:46

## 2024-11-26 RX ADMIN — Medication 2 MILLIGRAM(S): at 00:15

## 2024-11-26 RX ADMIN — PANTOPRAZOLE SODIUM 40 MILLIGRAM(S): 40 TABLET, DELAYED RELEASE ORAL at 15:20

## 2024-11-26 RX ADMIN — CHLORHEXIDINE GLUCONATE 1 APPLICATION(S): 1.2 RINSE ORAL at 07:18

## 2024-11-26 RX ADMIN — Medication 1000 MILLILITER(S): at 09:22

## 2024-11-26 RX ADMIN — POTASSIUM CHLORIDE 100 MILLIEQUIVALENT(S): 600 TABLET, EXTENDED RELEASE ORAL at 08:15

## 2024-11-26 RX ADMIN — ROSUVASTATIN CALCIUM 10 MILLIGRAM(S): 5 TABLET, FILM COATED ORAL at 21:41

## 2024-11-26 RX ADMIN — PHENYLEPHRINE HYDROCHLORIDE 14.9 MICROGRAM(S)/KG/MIN: 10 INJECTION INTRAVENOUS at 15:20

## 2024-11-26 RX ADMIN — PIPERACILLIN SODIUM AND TAZOBACTAM SODIUM 25 GRAM(S): 4; .5 INJECTION, POWDER, LYOPHILIZED, FOR SOLUTION INTRAVENOUS at 05:41

## 2024-11-26 RX ADMIN — POTASSIUM CHLORIDE 100 MILLIEQUIVALENT(S): 600 TABLET, EXTENDED RELEASE ORAL at 14:43

## 2024-11-26 RX ADMIN — Medication 1000 MILLILITER(S): at 09:45

## 2024-11-26 RX ADMIN — ACETAMINOPHEN 500MG 650 MILLIGRAM(S): 500 TABLET, COATED ORAL at 06:43

## 2024-11-26 RX ADMIN — POTASSIUM CHLORIDE 40 MILLIEQUIVALENT(S): 600 TABLET, EXTENDED RELEASE ORAL at 08:15

## 2024-11-26 RX ADMIN — POTASSIUM CHLORIDE 20 MILLIEQUIVALENT(S): 600 TABLET, EXTENDED RELEASE ORAL at 16:53

## 2024-11-26 RX ADMIN — DILTIAZEM HYDROCHLORIDE 10 MILLIGRAM(S): 240 CAPSULE, COATED, EXTENDED RELEASE ORAL at 03:45

## 2024-11-26 RX ADMIN — POTASSIUM CHLORIDE 100 MILLIEQUIVALENT(S): 600 TABLET, EXTENDED RELEASE ORAL at 03:45

## 2024-11-26 RX ADMIN — POTASSIUM CHLORIDE 40 MILLIEQUIVALENT(S): 600 TABLET, EXTENDED RELEASE ORAL at 19:51

## 2024-11-26 RX ADMIN — Medication 1000 MILLILITER(S): at 15:10

## 2024-11-26 RX ADMIN — POTASSIUM CHLORIDE 100 MILLIEQUIVALENT(S): 600 TABLET, EXTENDED RELEASE ORAL at 02:45

## 2024-11-26 RX ADMIN — Medication 2 PACKET(S): at 21:41

## 2024-11-26 RX ADMIN — Medication 1000 MILLILITER(S): at 12:25

## 2024-11-26 RX ADMIN — POTASSIUM CHLORIDE 100 MILLIEQUIVALENT(S): 600 TABLET, EXTENDED RELEASE ORAL at 16:52

## 2024-11-26 RX ADMIN — POTASSIUM PHOSPHATE, MONOBASIC POTASSIUM PHOSPHATE, DIBASIC INJECTION, 83.33 MILLIMOLE(S): 236; 224 SOLUTION, CONCENTRATE INTRAVENOUS at 05:54

## 2024-11-26 RX ADMIN — POTASSIUM CHLORIDE 100 MILLIEQUIVALENT(S): 600 TABLET, EXTENDED RELEASE ORAL at 12:30

## 2024-11-26 RX ADMIN — PIPERACILLIN SODIUM AND TAZOBACTAM SODIUM 25 GRAM(S): 4; .5 INJECTION, POWDER, LYOPHILIZED, FOR SOLUTION INTRAVENOUS at 13:00

## 2024-11-26 RX ADMIN — Medication 100 GRAM(S): at 21:40

## 2024-11-26 RX ADMIN — POTASSIUM CHLORIDE 100 MILLIEQUIVALENT(S): 600 TABLET, EXTENDED RELEASE ORAL at 09:46

## 2024-11-26 RX ADMIN — POTASSIUM CHLORIDE 100 MILLIEQUIVALENT(S): 600 TABLET, EXTENDED RELEASE ORAL at 00:17

## 2024-11-26 RX ADMIN — PIPERACILLIN SODIUM AND TAZOBACTAM SODIUM 25 GRAM(S): 4; .5 INJECTION, POWDER, LYOPHILIZED, FOR SOLUTION INTRAVENOUS at 21:41

## 2024-11-26 RX ADMIN — Medication 200 MILLILITER(S): at 05:54

## 2024-11-26 NOTE — CONSULT NOTE ADULT - CONSULT REASON
pavithra  metabolic acidosis   hypokalemia   hypophos
Left small cortical Hemorrhage / Hx of Melanoma w/ Treatment
diarrhea
AF
metastatic melanoma
persistent diarrhea

## 2024-11-26 NOTE — CONSULT NOTE ADULT - ASSESSMENT
73 y/o M with PMHx of aortic aneurysm, chronic lower back pain, HLD, HTN, OA, metastatic melanoma on Biologics presents to the ED c/o diarrhea x1 month, severe at times having more than 10 bms per today. Vomited yesterday, and became weak having 3 falls injuring his head. Today pt was so weak he couldn't get up, so he called EMS. EMS found him to be hypotensive, awake, and alert. Pt also noted that he passed out today. BIB EMS, hypotensive with rpaid heart beat. PT with no chest pain or SOB, no pain anywhere.    New onset AF   75 y/o M with PMHx of aortic aneurysm, chronic lower back pain, HLD, HTN, OA, metastatic melanoma on Biologics presents to the ED c/o diarrhea x1 month, severe at times having more than 10 bms per today. Vomited yesterday, and became weak having 3 falls injuring his head. Today pt was so weak he couldn't get up, so he called EMS. EMS found him to be hypotensive, awake, and alert. Pt also noted that he passed out today. BIB EMS, hypotensive with rpaid heart beat. PT with no chest pain or SOB, no pain anywhere.    Shock, r/o septic shock  Hypovolemia. NEEL/ATN. Diarreah   New onset AF  Elevated Trop, suspect demand ischemia  Brain Lesion cannot r/o hemmorrhage  Hyperlipidemia  Aortic Aneurysm (Last measurement 5.4cm in size)  Electrolyte Abnormalities    CCU Care  Monitor on tele  No AC for now given cannot exclude hemmorhage on CT, awat further imaging  PRN Lopressor for HR >120  On Levo  Echocardiogram pending    Case d/w Dr. aY  Will follow

## 2024-11-26 NOTE — DIETITIAN INITIAL EVALUATION ADULT - PERTINENT MEDS FT
MEDICATIONS  (STANDING):  chlorhexidine 2% Cloths 1 Application(s) Topical <User Schedule>  lactated ringers. 1000 milliLiter(s) (100 mL/Hr) IV Continuous <Continuous>  norepinephrine Infusion 0.05 MICROgram(s)/kG/Min (12.4 mL/Hr) IV Continuous <Continuous>  phenylephrine    Infusion 0.3 MICROgram(s)/kG/Min (14.9 mL/Hr) IV Continuous <Continuous>  piperacillin/tazobactam IVPB.. 3.375 Gram(s) IV Intermittent every 12 hours  potassium chloride  10 mEq/100 mL IVPB 10 milliEquivalent(s) IV Intermittent every 1 hour  potassium phosphate / sodium phosphate Powder (PHOS-NaK) 2 Packet(s) Oral two times a day  rosuvastatin 10 milliGRAM(s) Oral at bedtime    MEDICATIONS  (PRN):  acetaminophen     Tablet .. 650 milliGRAM(s) Oral every 6 hours PRN Temp greater or equal to 38C (100.4F)  metoprolol tartrate Injectable 2.5 milliGRAM(s) IV Push every 6 hours PRN HR >120    Home Medications:  lisinopril-hydroCHLOROthiazide 20 mg-25 mg oral tablet: 1 tab(s) orally once a day (in the morning) (26 Nov 2024 08:29)  Metoprolol Tartrate 50 mg oral tablet: 1 tab(s) orally once a day (at bedtime) (26 Nov 2024 08:29)  Multiple Vitamins oral capsule: 1 cap(s) orally once a day (26 Nov 2024 08:29)  predniSONE 20 mg oral tablet: 4 tab(s) orally once a day x 14 days ***Course Not Complete*** pt stopped (26 Nov 2024 08:29)  rosuvastatin 10 mg oral tablet: 1 tab(s) orally once a day (at bedtime) (26 Nov 2024 08:29)  Vitamin C 500 mg oral tablet: 1 tab(s) orally once a day (26 Nov 2024 08:29)

## 2024-11-26 NOTE — CONSULT NOTE ADULT - ASSESSMENT
Assessment:  74M with aortic aneurysm, chronic lower back pain, HLD, HTN, OA, metastatic melanoma on biologics presents 11/25 with diarrhea and generalized weakness  Febrile 102F on admission, on RA  WBC 24 > 21  Cr 4.3 > 2.36  Lactate 4.9 > 3.7  UA grossly negative for infection, 9 wbc 400 rbc 10 sqe  CTH with Left frontal peripheral high density lesion consistent with metastatic melanoma, cannot exclude a small amount of hemorrhage. Vasogenic edema with mild mass effect on the left lateral ventricle.  CTAP without SBO, no colitis, grossly negative for infectious source  CT Chest without pneumonia   RVP negative    Antimicrobials:  piperacillin/tazobactam IVPB.. 3.375 every 12 hours (11/25 --- )    Impression:   #Shock  #Fever  #Diarrhea  #Leukocytosis  #Lactic Acidosis  #Metastatic melanoma     Recommendations:    - follow up BCx x2, GI PCR, Stool Culture      Clinical team may change from intravenous to oral antibiotics when the following criteria are met:   1. Patient is clinically improving/stable       a)	Improved signs and symptoms of infection from initial presentation       b)	Afebrile for 24 hours       c)	Leukocytosis trending towards normal range   2. Patient is tolerating oral intake   3. Initial/repeat blood cultures are negative OR do not need to wait for preliminary blood cultures to result    When above criteria met, may change iv antibiotics to: agent, dose, frequency, duration.  Cannot advise changing to oral antibiotic therapy until culture sensitivity is available.   Assessment:  74M with aortic aneurysm, chronic lower back pain, HLD, HTN, OA, metastatic melanoma on biologics presents 11/25 with generalized weakness, has had 3 falls at home and he couldn;t get up  Reports diarrhea for 1 month duration, on going loose stools atleast 8-10times a day  Denies any fever or chills, until admission  No sick contact  Recently followed up with GI outpatient, underwent sigmoidoscopy ~2 weeks ago at [facility] [place] with positive results but unsure what they were, supposedly to start medication  Febrile 102F on admission, on RA  WBC 24 > 21  Cr 4.3 > 2.36  Lactate 4.9 > 3.7  UA grossly negative for infection, 9 wbc 400 rbc 10 sqe  CTH with Left frontal peripheral high density lesion consistent with metastatic melanoma, cannot exclude a small amount of hemorrhage. Vasogenic edema with mild mass effect on the left lateral ventricle.  CTAP without SBO, no colitis, grossly negative for infectious source  CT Chest without pneumonia   RVP negative    Antimicrobials:  piperacillin/tazobactam IVPB.. 3.375 every 12 hours (11/25 --- )    Impression:   #Shock  #Fever  #Diarrhea  #Leukocytosis  #Lactic Acidosis  #Metastatic melanoma   - unclear etiology of diarrhea ?reportedly had positive result s/p recent sigmoidoscopy at [facility] [place], supposedly to start medication ?immunosuppresions   - Fever and shock ?multifactorial ?dehydration ?malignancy  - nontoxic appearing, no localizing symptom aside from chronic diarrhea    Recommendations:  - continue empiric Zosyn 3.375 q8 for now  - monitor temperature curve  - trend WBC  - side effects of antibiotic discussed, tolerating abx well so far  - check Cdiff test in setting of leukocytosis and diarrhea, though CT without colitis  - follow up BCx x2, GI PCR, Stool Culture  - GI eval  - obtain outpatient record regarding sigmoidoscopy results; will attempt to reach Asmita (Jorge who is RN) to obtain collateral info    Clinical team may change from intravenous to oral antibiotics when the following criteria are met:   1. Patient is clinically improving/stable       a)	Improved signs and symptoms of infection from initial presentation       b)	Afebrile for 24 hours       c)	Leukocytosis trending towards normal range   2. Patient is tolerating oral intake   3. Initial/repeat blood cultures are negative OR do not need to wait for preliminary blood cultures to result    Cannot advise changing to oral antibiotic therapy until culture sensitivity is available.   Assessment:  74M with aortic aneurysm, chronic lower back pain, HLD, HTN, OA, metastatic melanoma on biologics presents 11/25 with generalized weakness, has had 3 falls at home and he couldn;t get up  Reports diarrhea for 1 month duration, on going loose stools atleast 8-10times a day  Denies any fever or chills, until admission  No sick contact  Recently followed up with GI outpatient, underwent sigmoidoscopy ~2 weeks ago at [facility] [place] with positive results but unsure what they were, supposedly to start medication  Febrile 102F on admission, on RA  WBC 24 > 21  Cr 4.3 > 2.36  Lactate 4.9 > 3.7  UA grossly negative for infection, 9 wbc 400 rbc 10 sqe  CTH with Left frontal peripheral high density lesion consistent with metastatic melanoma, cannot exclude a small amount of hemorrhage. Vasogenic edema with mild mass effect on the left lateral ventricle.  CTAP without SBO, no colitis, grossly negative for infectious source  CT Chest without pneumonia   RVP negative    Antimicrobials:  piperacillin/tazobactam IVPB.. 3.375 every 12 hours (11/25 --- )    Impression:   #Shock  #Fever  #Diarrhea  #Leukocytosis  #Lactic Acidosis  #Metastatic melanoma   - unclear etiology of diarrhea ?reportedly had positive result s/p recent sigmoidoscopy at [facility] [place], supposedly to start medication ?immunosuppresions   - Fever and shock ?multifactorial ?dehydration ?malignancy  - nontoxic appearing, no localizing symptom aside from chronic diarrhea    Recommendations:  - continue empiric Zosyn 3.375 q8 for now  - monitor temperature curve  - trend WBC  - side effects of antibiotic discussed, tolerating abx well so far  - check Cdiff test in setting of leukocytosis and diarrhea, though CT without colitis  - follow up BCx x2, GI PCR, Stool Culture  - Oncology eval  - obtain outpatient record regarding sigmoidoscopy results; will attempt to reach Asmita (Jorge who is RN) to obtain collateral info    Clinical team may change from intravenous to oral antibiotics when the following criteria are met:   1. Patient is clinically improving/stable       a)	Improved signs and symptoms of infection from initial presentation       b)	Afebrile for 24 hours       c)	Leukocytosis trending towards normal range   2. Patient is tolerating oral intake   3. Initial/repeat blood cultures are negative OR do not need to wait for preliminary blood cultures to result    Cannot advise changing to oral antibiotic therapy until culture sensitivity is available.   Assessment:  74M with aortic aneurysm, chronic lower back pain, HLD, HTN, OA, metastatic melanoma on biologics presents 11/25 with generalized weakness, has had 3 falls at home and he couldn;t get up  Reports diarrhea for 1 month duration, on going loose stools atleast 8-10times a day  Denies any fever or chills, until admission  No sick contact  Recently followed up with GI outpatient, underwent [place]oidoscopy [place]2 weeks ago [place] [facility] [place], path showed immunotherapy-induced colitis, supposed to start ENTYVIO (vedolizumab)   Febrile 102F on admission, on RA  WBC 24 > 21  Cr 4.3 > 2.36  Lactate 4.9 > 3.7  UA grossly negative for infection, 9 wbc 400 rbc 10 sqe  CTH with Left frontal peripheral high density lesion consistent with metastatic melanoma, cannot exclude a small amount of hemorrhage. Vasogenic edema with mild mass effect on the left lateral ventricle.  CTAP without SBO, no colitis, grossly negative for infectious source  CT Chest without pneumonia   RVP negative    Antimicrobials:  piperacillin/tazobactam IVPB.. 3.375 every 12 hours (11/25 --- )    Impression:   #Shock  #Fever  #Diarrhea  #Leukocytosis  #Lactic Acidosis  #Metastatic melanoma   - Recently underwent sigmoidoscopy ~2 weeks ago at [facility] [place], path showed immunotherapy-induced colitis, supposed to start ENTYVIO (vedolizumab)   - Fever and shock ?multifactorial ?medication induced colitis ?dehydration ?malignancy  - nontoxic appearing, no localizing symptom aside from chronic diarrhea    Recommendations:  - continue empiric Zosyn 3.375 q8 for now  - monitor temperature curve  - trend WBC  - side effects of antibiotic discussed, tolerating abx well so far  - follow up BCx x2  - follow up GI PCR, Stool Culture  - Oncology eval, supposedly to received ENTYVIO (vedolizumab) for immunotherapy-induced colitis  - ICU care    Clinical team may change from intravenous to oral antibiotics when the following criteria are met:   1. Patient is clinically improving/stable       a)	Improved signs and symptoms of infection from initial presentation       b)	Afebrile for 24 hours       c)	Leukocytosis trending towards normal range   2. Patient is tolerating oral intake   3. Initial/repeat blood cultures are negative OR do not need to wait for preliminary blood cultures to result    Cannot advise changing to oral antibiotic therapy until culture sensitivity is available.
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
## 2024-11-26 NOTE — CONSULT NOTE ADULT - NS ATTEND AMEND GEN_ALL_CORE FT
Agree with plan above.
74 year old with aortica aneurysm, HLD, HTN, metastatic melanoma with diarrhea, admitted with septic shock. Consult for diarrhea.     C diff, GI PCR  Per records evaluation, SHAYLA states this is biologic induced and was supposed to start Entyvio; will hold off for now as hospitalized with shock.   Agree with steroids.   As this is a side effect from a chemotherapeutic agent, oncology knows how to manage with steroids.   As long as not infectious, can add standing immodium and tincture of opium to lessen diarrhea but since it is a mucosal inflammation medications like steroids should help. Would recommend high dose hydrocortisone 100mg IV q 8 unless oncology has other/different recommendations per their guidelines for chemo induced diarrhea.   D/w ICU team.

## 2024-11-26 NOTE — DIETITIAN INITIAL EVALUATION ADULT - NSFNSGIASSESSMENTFT_GEN_A_CORE
(11/25) Large, watery, light brown, stool odor excessive, fecal incontinence; pt is not receiving bowel regimen. Has flexy seal.

## 2024-11-26 NOTE — DIETITIAN INITIAL EVALUATION ADULT - ADD RECOMMEND
1) C/w Regular diet  2) Premier protein shake BID to optimize nutritional needs (provides 160 kcal, 30 g protein/ shake) and add 3 packets Banatrol 2/2 persistent diarrhea (provides 40 kcal, 0 g protein/ packet)   3) Obtain vitamin D 25OH level to assess nutriture  4) Please obtain daily weights  5) MVI w/ minerals daily to ensure 100% RDA met   6) Consider adding thiamine 200 mg daily 2/2 poor PO intake/ malnutrition  7) Encourage protein-rich foods, maximize food preferences  8) Monitor bowel movements, if no BM for >3 days, consider implementing bowel regimen.  9) Check serum zinc level for suspected deficiency. Consider adding 220 mg of zinc sulfate daily 2/2 persistent diarrhea   10) Monitor closely for refeeding syndrome - please obtain BMP, Mg, and Phos levels. Receiving KCl, and KPhos/NaPhos repletion  11) If pt unable to consume sufficient PO intake w/ very high stool output, may need to consider initiating nutrition support to help bridge PO intake to meet 100% of ENN  12) Confirm goals of care regarding nutrition support  RD will continue to monitor PO intake, labs, hydration, and wt prn.
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
## 2024-11-26 NOTE — CONSULT NOTE ADULT - ASSESSMENT
73 yo with hx of metastatic melanoma undergoing tx with msk admitted with 1 month of severe diarrhea with dec po intake   NEEL due to ATN/Volume depletion   Hyponatremia, depletional   Hypokalemia, depletional   hypophos, depletional     REC  - agree with LR  - KCL po/IV replacement   - PO po4 replacement   - fu trend of uop  - no ivc  - no nsaid   dw dr cr
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
## 2024-11-26 NOTE — CONSULT NOTE ADULT - ASSESSMENT
74 year old male with metastatic melanoma on biologics with persistent biologic induced diarrhea to be managed by oncology upon acquisition of full records from [facility].    Will follow peripherally at this time.  IV steroids per oncology service  Critical care advised to call back if any further needs or questions. 74 year old male with metastatic melanoma on biologics with persistent biologic induced diarrhea.    Will follow peripherally at this time.  IV steroids per oncology service  Critical care advised to call back if any further needs or questions.

## 2024-11-26 NOTE — DIETITIAN INITIAL EVALUATION ADULT - ORAL INTAKE PTA/DIET HISTORY
Reports very poor appetite/ intake x 1 mon 2/2 lack of appetite and frequent, loose BMs (>10x /day; worsening BMs at night). Normally has a very good appetite/ intake, however was only eating 1-2 meals/ day over the last month. Per diet recall, pt consuming <50% of ENN x 1 mon. Does not follow any diet restrictions and has been drinking multiple premier shakes at home to help w/ PO intake. Does the cooking/ shopping at home. Has had a very hard time over the last month getting up and cooking for himself so he may occasionally skip a meal.

## 2024-11-26 NOTE — DIETITIAN INITIAL EVALUATION ADULT - ETIOLOGY
English r/t decreased ability to meet increased nutrient needs 2/2 persistent loose diarrhea, poor appetite, metastatic melanoma

## 2024-11-26 NOTE — PROGRESS NOTE ADULT - ASSESSMENT
A/P: 74 male who is presenting with massive diarrhea, NEEL, hypovolumic Shock, new onset AFIB  Metastatic Melanoma with a Brain mass  HTN  HLD  Obesity    Plan:  CCU    PULM: Stable    Cardio: Hypotensive from hypovolumic shock, continue to match I/Os, Continue Pressors, New onset AFIB.  No AC at this time awaiting Neuro Surg recommendation about the hemorrhagic met, rate control with Cardizen, cardio following, ECHO    Renal: NEEL likely from vol depletion, Continue LR at 100, aggressively replace lytes, repeat BMP    GI: Send Stool Culture and GI PCR    ID: Consult PND, continue Zosyn for now    ONC: Awaiting records form [facility] and recent sigmoidoscopy    Neuro Surg: Possible MRI brain    Venodynes

## 2024-11-26 NOTE — DIETITIAN INITIAL EVALUATION ADULT - OTHER INFO
73 y/o M with a PMHx of aortic aneurysm, chronic lower back pain, HLD, HTN, OA, metastatic melanoma on Biologics presented to the ED c/o diarrhea x1 month, severe at times having more than 10 bms per today. Vomited yesterday, and became weak having 3 falls injuring his head. Today pt was so weak he couldn't get up, so he called EMS. EMS found him to be hypotensive, awake, and alert. Pt also noted that he passed out today. Admitted for shock, hypotension, ? sepsis, NEEL; admitted to CCU. Per CCU PA "In setting of metastatic melanoma on checkpoint inhibitor, heavy diarrhea for 1 month, has not been able to keep up with PO intake." CTH with left frontal peripheral high density lesion c/w metastatic melanoma, vasogenic edema with mild mass effect on left lateral ventricle.     Visited pt this morning, pt on DASH/ TLC diet however pt reports that he is currently NPO. Discussed in IDR, per Dr. Ojeda pt is not NPO and liberalized diet to Regular. Endorses a UBW of 319# (1 mon ago) and does not know his current weight 2/2 unable to step on a scale over the last month. RD obtained bedscale wt on 11/26 - 301#. No weight hx to review. Weight loss of 18# (5.6%) x 1 mon - clinsig and severe. NFPE reveals mild orbital wasting, however pt meets criteria for PCM at this time 2/2 poor po intake and weight loss. Pt overweight/ obese appearing and pt's body habitus may be masking further wasting. C/w Regular diet. Encouraged high protein intake, pt receptive to receiving premier protein BID (van or obdulio) in effort to optimize PO intake. Will also add 3 packets Banatrol 2/2 persistent diarrhea. If pt unable to consume sufficient PO intake w/ very high stool output, may need to consider initiating nutrition support to help bridge PO intake to meet 100% of ENN. Monitor closely for refeeding syndrome - please obtain BMP, Mg, and Phos levels. Monitor and replete K, Phos, Mg prn if begins to downtrend. If nutrition support is initiated, recommend to check refeeding labs BID x 2-3 days upon initiation and then will reevaluate. Consider KPhos suppl BID in effort to supplement low lytes prior to initiating nutrition support. Consider adding 200mg of thiamine and MVI w/ minerals daily. Please see additional recommendations below.

## 2024-11-26 NOTE — PROGRESS NOTE ADULT - PSYCHIATRIC
Advised patient of Dr. Baltazar coy. Patient verbalized understanding. Patient indicated that she is currently in bed and laying on her side. Pain has subsided. Stated she is chilling. If pain returns or continues then will go to urgent care or ER. normal affect/alert and oriented x3

## 2024-11-26 NOTE — PROGRESS NOTE ADULT - ASSESSMENT
A:    74yMale  HD #    Here for:    1. Shock, POA  2. Hypotension  3. ? sepsis  4. NEEL  5. MSOF  6. Hypokalemia  7. New onset SHAYY    This patient requires critical care for support of one or more vital organ systems with a high probability of imminent or life threatening deterioration in his/her condition    P:    Shock, hypovolemic +/- septic  In setting of metastatic melanoma on checkpoint inhibitor, heavy diarrhea for 1 month, has not been able to keep up with PO intake    Now with new onset SHAYY    Change pressors from levo to carlee  Give cardizem single dose to gauge effect  Obtain GI consult for diarrhea  Give loperamide 1 dose    Sepsis bundle  f/u Cx's, white count, fever curve  Broad spectrum abx  Oncology consult  HD monitoring, ? new onset AF; Cards consult, will order echo  IS, OOB as able  NEEL, suspect pre renal + ATN; fonseca for I/O's, avoid renal toxic agents  Renal, cards, heme/onc consults  Replete K+ aggressively  Continue IVF w/ KCL in bag  VTE ppx  PIV access, minimze invasive catheters  May require rectal tube device      Dispo: Cont critical care.    Date of entry of this note is equal to the date of services rendered    TOTAL CRITICAL CARE TIME:  45 minutes (EXCLUSIVE of any non bundled procedures)    Note: This is a critically ill patient. Time spent has been in salvage of life, limb and vital organ systems. This time INCLUDES time spent directly as this patient's bedside with evaluation and management, review of chart including review of laboratory and imaging studies, interpretation of vital signs and cardiac output measurements, any necessary ventilator management, and time spent discussing plan of care with patient and family, including goals of care discussion. This also includes time spent in multidisciplinary discussion with care team and various consultants to optimize treatment plan. This time may NOT include various procedures, which will be noted separately
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
## 2024-11-26 NOTE — CONSULT NOTE ADULT - CONSULT REQUESTED DATE/TIME
26-Nov-2024 14:25
26-Nov-2024 11:08
26-Nov-2024 09:54
26-Nov-2024 09:59
26-Nov-2024 09:25
26-Nov-2024 10:16
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
## 2024-11-26 NOTE — DIETITIAN INITIAL EVALUATION ADULT - PERTINENT LABORATORY DATA
11-26    132[L]  |  103  |  41[H]  ----------------------------<  147[H]  2.8[LL]   |  21[L]  |  2.36[H]    Ca    8.3[L]      26 Nov 2024 04:55  Phos  2.3     11-26  Mg     2.2     11-26    TPro  6.3  /  Alb  2.7[L]  /  TBili  0.5  /  DBili  x   /  AST  17  /  ALT  16  /  AlkPhos  58  11-25

## 2024-11-26 NOTE — DIETITIAN INITIAL EVALUATION ADULT - NSFNSGIIOFT_GEN_A_CORE
11-25-24 @ 07:01  -  11-26-24 @ 07:00  --------------------------------------------------------  OUT:    Rectal Tube (mL): 2500 mL  Total OUT: 2500 mL    Total NET: -2500 mL

## 2024-11-26 NOTE — PHARMACOTHERAPY INTERVENTION NOTE - COMMENTS
Medication history complete, reviewed medication with patient and confirmed with DrFirst. Patient states he stopped taking aspirin.

## 2024-11-26 NOTE — DIETITIAN INITIAL EVALUATION ADULT - OTHER CALCULATIONS
Energy and fluid needs based on bedscale wt taken by RD on 11/26 - 301#  Protein needs based on IBW of 160#

## 2024-11-26 NOTE — CONSULT NOTE ADULT - ASSESSMENT
INCOMPLETE NOTE AS AWAITING RECORDS FROM [facility]  73 [place]/o M who [place] [facility]/o [place] on immunotherapy, aortic aneurysm, chronic lower back pain, HLD, HTN, OA,  presents to the ED c/o diarrhea x1 month.    # metastatic melanoma- mets to brain and adrenal gland   - [place] [facility] [place]   - diagnosed via adrenal biopsy 10/15/24   - pt given dose of nivo/ipilimumab on 11/8/24   - 11/5/24 CT c/a/p uncahnged confluent mesenteric adenopathy up to 4 cm near midline with small bowelmural thickening noted     # small ICH- known brain mets   - last MRI brain 11/5/24 at [facility] [place] with multiple brain lesions c/w metastatic disease some of which have hemorrhagic features including the dominant left middle frontal lesion. LEft superficial occipital lesion is probably cortical, possibly leptomeningeal   - CTH ordered for AMS and Left frontal small cortical - non surgical ICH.  - seen by neurosurgery and will repeat study later this afternoon - MRI brain ordered as well     # diarrhea   - diarrhea likely 2/2 reaction with medications  - he was started on prednisone 80 mg qd on 11/12/24 with rapid improvement and was tapered down to 60 mg now with worsening diarrhea  - infectious stool studies were negative on 11/13/24 and fecal calprotetin was 349   - plan was to start vedolizumab for acute diarrhea  - will start methylprednisolone 1 mg/kg qd   - will start infliximab: 5 mg/kg at week 0, a second dose may be repeated 2 weeks later, and a third dose may be considered at 6 weeks if needed; use in combination with a corticosteroid  - will start infliximab 660 mg x 1 dose today in combination with steroids   - replete lytes    # hypotension- remains on phenlephrine   # afib- cardio following     will follow daily and [place] with [facility]

## 2024-11-27 LAB
ANION GAP SERPL CALC-SCNC: 3 MMOL/L — LOW (ref 5–17)
ANION GAP SERPL CALC-SCNC: 5 MMOL/L — SIGNIFICANT CHANGE UP (ref 5–17)
BUN SERPL-MCNC: 28 MG/DL — HIGH (ref 7–23)
BUN SERPL-MCNC: 32 MG/DL — HIGH (ref 7–23)
CALCIUM SERPL-MCNC: 7.9 MG/DL — LOW (ref 8.5–10.1)
CALCIUM SERPL-MCNC: 8.1 MG/DL — LOW (ref 8.5–10.1)
CHLORIDE SERPL-SCNC: 105 MMOL/L — SIGNIFICANT CHANGE UP (ref 96–108)
CHLORIDE SERPL-SCNC: 107 MMOL/L — SIGNIFICANT CHANGE UP (ref 96–108)
CO2 SERPL-SCNC: 21 MMOL/L — LOW (ref 22–31)
CO2 SERPL-SCNC: 22 MMOL/L — SIGNIFICANT CHANGE UP (ref 22–31)
CREAT SERPL-MCNC: 1.14 MG/DL — SIGNIFICANT CHANGE UP (ref 0.5–1.3)
CREAT SERPL-MCNC: 1.18 MG/DL — SIGNIFICANT CHANGE UP (ref 0.5–1.3)
EGFR: 64 ML/MIN/1.73M2 — SIGNIFICANT CHANGE UP
EGFR: 67 ML/MIN/1.73M2 — SIGNIFICANT CHANGE UP
GLUCOSE SERPL-MCNC: 123 MG/DL — HIGH (ref 70–99)
GLUCOSE SERPL-MCNC: 129 MG/DL — HIGH (ref 70–99)
HCT VFR BLD CALC: 30.1 % — LOW (ref 39–50)
HGB BLD-MCNC: 10.5 G/DL — LOW (ref 13–17)
MAGNESIUM SERPL-MCNC: 2.2 MG/DL — SIGNIFICANT CHANGE UP (ref 1.6–2.6)
MCHC RBC-ENTMCNC: 27.3 PG — SIGNIFICANT CHANGE UP (ref 27–34)
MCHC RBC-ENTMCNC: 34.9 G/DL — SIGNIFICANT CHANGE UP (ref 32–36)
MCV RBC AUTO: 78.4 FL — LOW (ref 80–100)
PHOSPHATE SERPL-MCNC: 2.8 MG/DL — SIGNIFICANT CHANGE UP (ref 2.5–4.5)
PLATELET # BLD AUTO: 157 K/UL — SIGNIFICANT CHANGE UP (ref 150–400)
POTASSIUM SERPL-MCNC: 3.1 MMOL/L — LOW (ref 3.5–5.3)
POTASSIUM SERPL-MCNC: 3.9 MMOL/L — SIGNIFICANT CHANGE UP (ref 3.5–5.3)
POTASSIUM SERPL-SCNC: 3.1 MMOL/L — LOW (ref 3.5–5.3)
POTASSIUM SERPL-SCNC: 3.9 MMOL/L — SIGNIFICANT CHANGE UP (ref 3.5–5.3)
RBC # BLD: 3.84 M/UL — LOW (ref 4.2–5.8)
RBC # FLD: 13.3 % — SIGNIFICANT CHANGE UP (ref 10.3–14.5)
SODIUM SERPL-SCNC: 131 MMOL/L — LOW (ref 135–145)
SODIUM SERPL-SCNC: 132 MMOL/L — LOW (ref 135–145)
WBC # BLD: 17.23 K/UL — HIGH (ref 3.8–10.5)
WBC # FLD AUTO: 17.23 K/UL — HIGH (ref 3.8–10.5)

## 2024-11-27 PROCEDURE — 99232 SBSQ HOSP IP/OBS MODERATE 35: CPT

## 2024-11-27 PROCEDURE — 99291 CRITICAL CARE FIRST HOUR: CPT

## 2024-11-27 RX ORDER — 0.9 % SODIUM CHLORIDE 0.9 %
1000 INTRAVENOUS SOLUTION INTRAVENOUS ONCE
Refills: 0 | Status: COMPLETED | OUTPATIENT
Start: 2024-11-27 | End: 2024-11-27

## 2024-11-27 RX ORDER — POTASSIUM CHLORIDE 600 MG/1
40 TABLET, EXTENDED RELEASE ORAL ONCE
Refills: 0 | Status: COMPLETED | OUTPATIENT
Start: 2024-11-27 | End: 2024-11-27

## 2024-11-27 RX ORDER — METOPROLOL TARTRATE 100 MG/1
25 TABLET, FILM COATED ORAL EVERY 8 HOURS
Refills: 0 | Status: DISCONTINUED | OUTPATIENT
Start: 2024-11-27 | End: 2024-12-02

## 2024-11-27 RX ORDER — POTASSIUM CHLORIDE 600 MG/1
10 TABLET, EXTENDED RELEASE ORAL
Refills: 0 | Status: COMPLETED | OUTPATIENT
Start: 2024-11-27 | End: 2024-11-27

## 2024-11-27 RX ORDER — POTASSIUM CHLORIDE 600 MG/1
40 TABLET, EXTENDED RELEASE ORAL
Refills: 0 | Status: COMPLETED | OUTPATIENT
Start: 2024-11-27 | End: 2024-11-27

## 2024-11-27 RX ORDER — METOPROLOL TARTRATE 100 MG/1
5 TABLET, FILM COATED ORAL ONCE
Refills: 0 | Status: COMPLETED | OUTPATIENT
Start: 2024-11-27 | End: 2024-11-27

## 2024-11-27 RX ADMIN — INFLIXIMAB 125 MILLIGRAM(S): 100 INJECTION, POWDER, LYOPHILIZED, FOR SOLUTION INTRAVENOUS at 12:15

## 2024-11-27 RX ADMIN — POTASSIUM CHLORIDE 40 MILLIEQUIVALENT(S): 600 TABLET, EXTENDED RELEASE ORAL at 06:54

## 2024-11-27 RX ADMIN — ACETAMINOPHEN 500MG 650 MILLIGRAM(S): 500 TABLET, COATED ORAL at 13:45

## 2024-11-27 RX ADMIN — Medication 100 MILLILITER(S): at 18:34

## 2024-11-27 RX ADMIN — CHLORHEXIDINE GLUCONATE 1 APPLICATION(S): 1.2 RINSE ORAL at 05:27

## 2024-11-27 RX ADMIN — ACETAMINOPHEN 500MG 650 MILLIGRAM(S): 500 TABLET, COATED ORAL at 10:50

## 2024-11-27 RX ADMIN — METOPROLOL TARTRATE 25 MILLIGRAM(S): 100 TABLET, FILM COATED ORAL at 21:17

## 2024-11-27 RX ADMIN — Medication 1000 MILLILITER(S): at 06:56

## 2024-11-27 RX ADMIN — POTASSIUM CHLORIDE 40 MILLIEQUIVALENT(S): 600 TABLET, EXTENDED RELEASE ORAL at 10:24

## 2024-11-27 RX ADMIN — Medication 100 MILLILITER(S): at 08:03

## 2024-11-27 RX ADMIN — ROSUVASTATIN CALCIUM 10 MILLIGRAM(S): 5 TABLET, FILM COATED ORAL at 21:17

## 2024-11-27 RX ADMIN — PIPERACILLIN SODIUM AND TAZOBACTAM SODIUM 25 GRAM(S): 4; .5 INJECTION, POWDER, LYOPHILIZED, FOR SOLUTION INTRAVENOUS at 05:20

## 2024-11-27 RX ADMIN — POTASSIUM CHLORIDE 40 MILLIEQUIVALENT(S): 600 TABLET, EXTENDED RELEASE ORAL at 18:35

## 2024-11-27 RX ADMIN — Medication 125 MILLIGRAM(S): at 05:20

## 2024-11-27 RX ADMIN — Medication 25 MILLIGRAM(S): at 11:26

## 2024-11-27 RX ADMIN — METOPROLOL TARTRATE 5 MILLIGRAM(S): 100 TABLET, FILM COATED ORAL at 09:42

## 2024-11-27 RX ADMIN — POTASSIUM CHLORIDE 100 MILLIEQUIVALENT(S): 600 TABLET, EXTENDED RELEASE ORAL at 09:42

## 2024-11-27 RX ADMIN — PANTOPRAZOLE SODIUM 40 MILLIGRAM(S): 40 TABLET, DELAYED RELEASE ORAL at 08:02

## 2024-11-27 RX ADMIN — POTASSIUM CHLORIDE 100 MILLIEQUIVALENT(S): 600 TABLET, EXTENDED RELEASE ORAL at 06:56

## 2024-11-27 RX ADMIN — POTASSIUM CHLORIDE 100 MILLIEQUIVALENT(S): 600 TABLET, EXTENDED RELEASE ORAL at 10:55

## 2024-11-27 NOTE — PROGRESS NOTE ADULT - ASSESSMENT
Assessment:  74M with aortic aneurysm, chronic lower back pain, HLD, HTN, OA, metastatic melanoma on biologics presents 11/25 with generalized weakness, has had 3 falls at home and he couldn;t get up  Reports diarrhea for 1 month duration, on going loose stools atleast 8-10times a day  Denies any fever or chills, until admission  No sick contact  Recently followed up with GI outpatient, underwent sigmoidoscopy ~2 weeks ago at [facility] [place], path showed immunotherapy-induced colitis, supposed to start ENTYVIO (vedolizumab)   Febrile 102F on admission, on RA  WBC 24 > 21  Cr 4.3 > 2.36  Lactate 4.9 > 3.7  UA grossly negative for infection, 9 wbc 400 rbc 10 sqe  CTH with Left frontal peripheral high density lesion consistent with metastatic melanoma, cannot exclude a small amount of hemorrhage. Vasogenic edema with mild mass effect on the left lateral ventricle.  CTAP without SBO, no colitis, grossly negative for infectious source  CT Chest without pneumonia   RVP negative  BCx 11/25 negative  GI PCR, Stool culture negative  TTE without obvious vegetation    Antimicrobials:  piperacillin/tazobactam IVPB.. 3.375 every 12 hours (11/25 --- 11/27)    Impression:   #Shock  #Fever  #Diarrhea  #Leukocytosis  #Lactic Acidosis  #Metastatic melanoma   - Recently underwent sigmoidoscopy ~2 weeks ago at [facility] [place], path showed immunotherapy-induced colitis, supposed to start ENTYVIO (vedolizumab)   - Fever and shock ?multifactorial ?medication induced colitis ?dehydration ?malignancy  - nontoxic appearing, no localizing symptom aside from chronic diarrhea, no obvious acute infection    Recommendations:  - stop zosyn, monitor off antibiotic  - monitor temperature curve  - trend WBC  - Oncology eval appreciated  - ICU care    HH Token not applicable   Assessment:  74M with aortic aneurysm, chronic lower back pain, HLD, HTN, OA, metastatic melanoma on biologics presents 11/25 with generalized weakness, has had 3 falls at home and he couldn;t get up  Reports diarrhea for 1 month duration, on going loose stools atleast 8-10times a day  Denies any fever or chills, until admission  No sick contact  Born and raised in [place] [facility], no known TB exposure  Recently followed up with GI outpatient, underwent [place] ago [place] [facility] [place] showed immunotherapy-induced colitis, supposed to start ENTYVIO (vedolizumab)   Febrile 102F on admission, on RA  WBC 24 > 21  Cr 4.3 > 2.36  Lactate 4.9 > 3.7  UA grossly negative for infection, 9 wbc 400 rbc 10 sqe  CTH with Left frontal peripheral high density lesion consistent with metastatic melanoma, cannot exclude a small amount of hemorrhage. Vasogenic edema with mild mass effect on the left lateral ventricle.  CTAP without SBO, no colitis, grossly negative for infectious source  CT Chest without pneumonia, no nodules   RVP negative  BCx 11/25 negative  GI PCR, Stool culture negative  TTE without obvious vegetation    Antimicrobials:  piperacillin/tazobactam IVPB.. 3.375 every 12 hours (11/25 --- 11/27)    Impression:   #Shock  #Fever  #Diarrhea  #Leukocytosis  #Lactic Acidosis  #Metastatic melanoma   - Recently underwent sigmoidoscopy ~2 weeks ago [place] [facility] [place], [place] showed immunotherapy-induced colitis, supposed to start ENTYVIO (vedolizumab)   - Fever and shock ?multifactorial ?medication induced colitis ?dehydration ?malignancy  - nontoxic appearing, no localizing symptom aside from chronic diarrhea, no obvious acute infection    Recommendations:  - stop zosyn, monitor off antibiotic  - monitor temperature curve  - trend WBC  - Oncology eval appreciated  - ICU care    HH Token not applicable

## 2024-11-27 NOTE — PROGRESS NOTE ADULT - ASSESSMENT
73 [place]/o [place] [facility] [place] on immunotherapy, aortic aneurysm, chronic lower back pain, HLD, HTN, OA,  presents to the ED c/o diarrhea x1 month.    # metastatic melanoma- mets to brain and adrenal gland   - [place] [facility] [place]   - diagnosed via adrenal biopsy 10/15/24   - pt given dose of nivo/ipilimumab on 11/8/24   - 11/5/24 CT c/a/p uncahnged confluent mesenteric adenopathy up to 4 cm near midline with small bowelmural thickening noted     # small ICH- known brain mets   - last MRI brain 11/5/24 at [facility] with multiple brain lesions c/w metastatic disease some of which have hemorrhagic features including the dominant left middle frontal lesion. LEft superficial occipital lesion is probably cortical, possibly leptomeningeal   - CTH ordered for AMS and Left frontal small cortical - non surgical ICH.  - seen by neurosurgery and will repeat study later this afternoon - MRI brain ordered as well     # diarrhea   - diarrhea likely 2/2 reaction with medications  - he was started on prednisone 80 mg qd on 11/12/24 with rapid improvement and was tapered down to 60 mg now with worsening diarrhea  - infectious stool studies were negative on 11/13/24 and fecal calprotetin was 349 - stool culture in hospital negative   - c/w methylprednisolone 1 mg/kg qd   - will start infliximab: 5 mg/kg (660 mg) today- a second dose may be repeated 2 weeks later, and a third dose may be considered at 6 weeks if needed; use in combination with a corticosteroid  - replete lytes    # hypotension- improved, now off pressors   # afib- cardio following - -110 this am     will follow daily and communicate with [facility]

## 2024-11-27 NOTE — PROGRESS NOTE ADULT - SUBJECTIVE AND OBJECTIVE BOX
NEPHROLOGY INTERVAL HPI/OVERNIGHT EVENTS:  11-27-24 @ 10:10  HPI:  ICU Admit Note    S:    Pt seen and examined  73 y/o M with PMHx of aortic aneurysm, chronic lower back pain, HLD, HTN, OA, metastatic melanoma on Biologics presents to the ED c/o diarrhea x1 month, severe at times having more than 10 bms per today. Vomited yesterday, and became weak having 3 falls injuring his head. Today pt was so weak he couldn't get up, so he called EMS. EMS found him to be hypotensive, awake, and alert. Pt also noted that he passed out today. BIB EMS, hypotensive with rpaid heart beat. PT with no chest pain or SOB, no pain anywhere    ICU consult for MSOF, hypotension    11/25: NEEL, hypotensive despite aggressive fluid resuscitation, on pressors, sepsis bundle, w/u in progress    ROS: + weakness, diarrhea (25 Nov 2024 20:47)      Patient is a 74y old  Male who presents with a chief complaint of Shock (27 Nov 2024 09:57)      MEDICATIONS  (STANDING):  acetaminophen     Tablet .. 650 milliGRAM(s) Oral once  chlorhexidine 2% Cloths 1 Application(s) Topical <User Schedule>  diphenhydrAMINE 25 milliGRAM(s) Oral once  inFLIXimab-dyyb (INFLECTRA) 100 milliGRAM(s) Injectable IVPB (Rx Quick Charge) 40 milliGRAM(s) Waste   inFLIXimab-dyyb (INFLECTRA) IVPB 660 milliGRAM(s) IV Intermittent once  lactated ringers. 1000 milliLiter(s) (100 mL/Hr) IV Continuous <Continuous>  methylPREDNISolone sodium succinate Injectable 125 milliGRAM(s) IV Push daily  pantoprazole    Tablet 40 milliGRAM(s) Oral before breakfast  potassium chloride   Powder 40 milliEquivalent(s) Oral every 2 hours  potassium chloride  10 mEq/100 mL IVPB 10 milliEquivalent(s) IV Intermittent every 1 hour  rosuvastatin 10 milliGRAM(s) Oral at bedtime    MEDICATIONS  (PRN):  acetaminophen     Tablet .. 650 milliGRAM(s) Oral every 6 hours PRN Temp greater or equal to 38C (100.4F)  metoprolol tartrate Injectable 2.5 milliGRAM(s) IV Push every 6 hours PRN HR >120      Allergies    [place] (Short breath)  bacitracin topical (Hives)    Intolerances        I&O's Detail    26 Nov 2024 07:01  -  27 Nov 2024 07:00  --------------------------------------------------------  IN:    IV PiggyBack: 1435 mL    Lactated Ringers: 4200 mL    Lactated Ringers Bolus: 2000 mL    Phenylephrine: 1201 mL  Total IN: 8836 mL    OUT:    Indwelling Catheter - Urethral (mL): 1850 mL    Rectal Tube (mL): 7000 mL  Total OUT: 8850 mL    Total NET: -14 mL          .    Patient was seen and evaluated on dialysis.   Patient is tolerating the procedure well.   Continue dialysis:   Dialyzer:          QB:        QD:   Goal UF ___ over ___ Hours       Vital Signs Last 24 Hrs  T(C): 36.2 (27 Nov 2024 04:22), Max: 37.7 (26 Nov 2024 15:29)  T(F): 97.1 (27 Nov 2024 04:22), Max: 99.9 (26 Nov 2024 15:29)  HR: 117 (27 Nov 2024 08:00) (100 - 134)  BP: 119/80 (27 Nov 2024 08:00) (94/52 - 136/57)  BP(mean): 91 (27 Nov 2024 08:00) (66 - 105)  RR: 21 (27 Nov 2024 08:00) (15 - 26)  SpO2: 98% (27 Nov 2024 08:00) (88% - 100%)      Daily     Daily     PHYSICAL EXAM:  General: alert. awake Ox3  HEENT: MMM  CV: s1s2 rrr  LUNGS: B/L CTA  EXT: no edema    LABS:                        10.5   17.23 )-----------( 157      ( 27 Nov 2024 05:39 )             30.1     11-27    132[L]  |  105  |  28[H]  ----------------------------<  123[H]  3.1[L]   |  22  |  1.18    Ca    8.1[L]      27 Nov 2024 05:39  Phos  2.8     11-27  Mg     2.2     11-27    TPro  5.2[L]  /  Alb  2.1[L]  /  TBili  0.5  /  DBili  x   /  AST  17  /  ALT  16  /  AlkPhos  53  11-26    PT/INR - ( 25 Nov 2024 16:52 )   PT: 13.3 sec;   INR: 1.16 ratio         PTT - ( 25 Nov 2024 16:52 )  PTT:22.2 sec  Urinalysis Basic - ( 27 Nov 2024 05:39 )    Color: x / Appearance: x / SG: x / pH: x  Gluc: 123 mg/dL / Ketone: x  / Bili: x / Urobili: x   Blood: x / Protein: x / Nitrite: x   Leuk Esterase: x / RBC: x / WBC x   Sq Epi: x / Non Sq Epi: x / Bacteria: x      Magnesium: 2.2 mg/dL (11-27 @ 05:39)  Phosphorus: 2.8 mg/dL (11-27 @ 05:39)       NEPHROLOGY INTERVAL HPI/OVERNIGHT EVENTS:  11-27-24 @ 10:10    11/27 comfortable, rectal tube in place, drug induced colitis and started on infliximab   HPI:  75 yo with hx of metastatic melanoma undergoing tx with [facility] admitted with 1 month of severe diarrhea with dec po intake   send in via ems with hypotension with subsequent syncopal episode   noted to be in [facility]  + uop   denies any nsaid or recent outpt iv contrast studies done   PTA on ace/hctz    PAST MEDICAL & SURGICAL HISTORY:  - htn   - hld   - metastatic melanoma   - obesity   - OA        MEDICATIONS  (STANDING):  acetaminophen     Tablet .. 650 milliGRAM(s) Oral once  chlorhexidine 2% Cloths 1 Application(s) Topical <User Schedule>  diphenhydrAMINE 25 milliGRAM(s) Oral once  inFLIXimab-dyyb (INFLECTRA) 100 milliGRAM(s) Injectable IVPB (Rx Quick Charge) 40 milliGRAM(s) Waste   inFLIXimab-dyyb (INFLECTRA) IVPB 660 milliGRAM(s) IV Intermittent once  lactated ringers. 1000 milliLiter(s) (100 mL/Hr) IV Continuous <Continuous>  methylPREDNISolone sodium succinate Injectable 125 milliGRAM(s) IV Push daily  pantoprazole    Tablet 40 milliGRAM(s) Oral before breakfast  potassium chloride   Powder 40 milliEquivalent(s) Oral every 2 hours  potassium chloride  10 mEq/100 mL IVPB 10 milliEquivalent(s) IV Intermittent every 1 hour  rosuvastatin 10 milliGRAM(s) Oral at bedtime    MEDICATIONS  (PRN):  acetaminophen     Tablet .. 650 milliGRAM(s) Oral every 6 hours PRN Temp greater or equal to 38C (100.4F)  metoprolol tartrate Injectable 2.5 milliGRAM(s) IV Push every 6 hours PRN HR >120      Allergies    [place] (Short breath)  bacitracin topical (Hives)    Intolerances        I&O's Detail    26 Nov 2024 07:01  -  27 Nov 2024 07:00  --------------------------------------------------------  IN:    IV PiggyBack: 1435 mL    Lactated Ringers: 4200 mL    Lactated Ringers Bolus: 2000 mL    Phenylephrine: 1201 mL  Total IN: 8836 mL    OUT:    Indwelling Catheter - Urethral (mL): 1850 mL    Rectal Tube (mL): 7000 mL  Total OUT: 8850 mL    Total NET: -14 mL    Vital Signs Last 24 Hrs  T(C): 36.2 (27 Nov 2024 04:22), Max: 37.7 (26 Nov 2024 15:29)  T(F): 97.1 (27 Nov 2024 04:22), Max: 99.9 (26 Nov 2024 15:29)  HR: 117 (27 Nov 2024 08:00) (100 - 134)  BP: 119/80 (27 Nov 2024 08:00) (94/52 - 136/57)  BP(mean): 91 (27 Nov 2024 08:00) (66 - 105)  RR: 21 (27 Nov 2024 08:00) (15 - 26)  SpO2: 98% (27 Nov 2024 08:00) (88% - 100%)      Daily     Daily     PHYSICAL EXAM:  General: alert. awake NAD  HEENT: MMM  CV: s1s2 rrr  LUNGS: B/L CTA  EXT: no edema    LABS:                        10.5   17.23 )-----------( 157      ( 27 Nov 2024 05:39 )             30.1     11-27    132[L]  |  105  |  28[H]  ----------------------------<  123[H]  3.1[L]   |  22  |  1.18    Ca    8.1[L]      27 Nov 2024 05:39  Phos  2.8     11-27  Mg     2.2     11-27    TPro  5.2[L]  /  Alb  2.1[L]  /  TBili  0.5  /  DBili  x   /  AST  17  /  ALT  16  /  AlkPhos  53  11-26    PT/INR - ( 25 Nov 2024 16:52 )   PT: 13.3 sec;   INR: 1.16 ratio         PTT - ( 25 Nov 2024 16:52 )  PTT:22.2 sec  Urinalysis Basic - ( 27 Nov 2024 05:39 )    Color: x / Appearance: x / SG: x / pH: x  Gluc: 123 mg/dL / Ketone: x  / Bili: x / Urobili: x   Blood: x / Protein: x / Nitrite: x   Leuk Esterase: x / RBC: x / WBC x   Sq Epi: x / Non Sq Epi: x / Bacteria: x      Magnesium: 2.2 mg/dL (11-27 @ 05:39)  Phosphorus: 2.8 mg/dL (11-27 @ 05:39)

## 2024-11-27 NOTE — PROGRESS NOTE ADULT - SUBJECTIVE AND OBJECTIVE BOX
Date of Service:11-27-24 @ 09:57  Interval History/ROS: Afebrile overnight, on RA, WBC 17, TTE without vegetation, BCx negative, GI PCR and Stool culture negative, no fever or chills, no abdominal pain      REVIEW OF SYSTEMS  [  ] ROS unobtainable because:    [ x ] All other systems negative except as noted below    Constitutional:  [ ] fever [ ] chills  [ ] weight loss  [ ]night sweat  [ ]poor appetite/PO intake [ ]fatigue   Skin:  [ ] rash [ ] phlebitis	  Eyes: [ ] icterus [ ] pain  [ ] discharge	  ENMT: [ ] sore throat  [ ] thrush [ ] ulcers [ ] exudates [ ]anosmia  Respiratory: [ ] dyspnea [ ] hemoptysis [ ] cough [ ] sputum	  Cardiovascular:  [ ] chest pain [ ] palpitations [ ] edema	  Gastrointestinal:  [ ] nausea [ ] vomiting [ ] diarrhea [ ] constipation [ ] pain	  Genitourinary:  [ ] dysuria [ ] frequency [ ] hematuria [ ] discharge [ ] flank pain  [ ] incontinence  Musculoskeletal:  [ ] myalgias [ ] arthralgias [ ] arthritis  [ ] back pain  Neurological:  [ ] headache [ ] weakness [ ] seizures  [ ] confusion/altered mental status    Allergies  [place] (Short breath)  bacitracin topical (Hives)        ANTIMICROBIALS:    piperacillin/tazobactam IVPB.. 3.375 every 8 hours        OTHER MEDS: MEDICATIONS  (STANDING):  acetaminophen     Tablet .. 650 every 6 hours PRN  acetaminophen     Tablet .. 650 once  diphenhydrAMINE 25 once  inFLIXimab-dyyb (INFLECTRA) IVPB 660 once  methylPREDNISolone sodium succinate Injectable 125 daily  metoprolol tartrate Injectable 2.5 every 6 hours PRN  pantoprazole    Tablet 40 before breakfast  rosuvastatin 10 at bedtime      Vital Signs Last 24 Hrs  T(F): 97.1 (11-27-24 @ 04:22), Max: 102.1 (11-26-24 @ 06:00)    Vital Signs Last 24 Hrs  HR: 117 (11-27-24 @ 08:00) (100 - 134)  BP: 119/80 (11-27-24 @ 08:00) (94/52 - 136/57)  RR: 21 (11-27-24 @ 08:00)  SpO2: 98% (11-27-24 @ 08:00) (88% - 100%)  Wt(kg): --    EXAM:    Constitutional:  NAD  Head/Eyes: no icterus  LUNGS:  CTA  CVS:  regular rhythm  Abd:  soft, non-tender; non-distended  Ext:  no edema  Vascular:  IV site no erythema tenderness or discharge  Neuro: AAO X 3, non- focal    Labs:                        10.5   17.23 )-----------( 157      ( 27 Nov 2024 05:39 )             30.1     11-27    132[L]  |  105  |  28[H]  ----------------------------<  123[H]  3.1[L]   |  22  |  1.18    Ca    8.1[L]      27 Nov 2024 05:39  Phos  2.8     11-27  Mg     2.2     11-27    TPro  5.2[L]  /  Alb  2.1[L]  /  TBili  0.5  /  DBili  x   /  AST  17  /  ALT  16  /  AlkPhos  53  11-26      WBC Trend:  WBC Count: 17.23 (11-27-24 @ 05:39)  WBC Count: 21.59 (11-26-24 @ 04:55)  WBC Count: 24.49 (11-25-24 @ 16:52)      Creatine Trend:  Creatinine: 1.18 (11-27)  Creatinine: 1.66 (11-26)  Creatinine: 1.97 (11-26)  Creatinine: 2.36 (11-26)      Liver Biochemical Testing Trend:  Alanine Aminotransferase (ALT/SGPT): 16 (11-26)  Alanine Aminotransferase (ALT/SGPT): 16 (11-25)  Aspartate Aminotransferase (AST/SGOT): 17 (11-26-24 @ 18:49)  Aspartate Aminotransferase (AST/SGOT): 17 (11-25-24 @ 16:52)  Bilirubin Total: 0.5 (11-26)  Bilirubin Total: 0.5 (11-25)      Trend LDH      Urinalysis Basic - ( 27 Nov 2024 05:39 )    Color: x / Appearance: x / SG: x / pH: x  Gluc: 123 mg/dL / Ketone: x  / Bili: x / Urobili: x   Blood: x / Protein: x / Nitrite: x   Leuk Esterase: x / RBC: x / WBC x   Sq Epi: x / Non Sq Epi: x / Bacteria: x        MICROBIOLOGY:        Culture - Stool (collected 26 Nov 2024 09:00)  Source: .Stool  Preliminary Report:    No growth    Urinalysis with Rflx Culture (collected 25 Nov 2024 20:55)    Culture - Blood (collected 25 Nov 2024 16:38)  Source: .Blood BLOOD  Preliminary Report:    No growth at 24 hours    Culture - Blood (collected 25 Nov 2024 16:38)  Source: .Blood BLOOD  Preliminary Report:    No growth at 24 hours    Lactate, Blood: 3.7 (11-26 @ 04:55)  Lactate, Blood: 3.2 (11-25 @ 23:09)  Lactate, Blood: 3.4 (11-25 @ 20:30)  Lactate, Blood: 4.9 (11-25 @ 16:52)        RADIOLOGY:  imaging below personally reviewed    Xray Chest 1 View- PORTABLE-Urgent:  (25 Nov 2024 17:02)      CT Chest No Cont:   ACC: [id number] EXAM:  CT ABDOMEN AND PELVIS   ORDERED BY: RADHA WALKER     ACC: [id number] EXAM:  CT CHEST   ORDERED BY: RADHA WALKER     PROCEDURE DATE:  11/25/2024          INTERPRETATION:  CLINICAL INFORMATION: renal failure rapid afib JCT    COMPARISON: 2.9.24.    CONTRAST/COMPLICATIONS:  IV Contrast: NONE  Oral Contrast: NONE      PROCEDURE:  CT of the Chest, Abdomen and Pelvis was performed.  Sagittal and coronal reformats were performed.    FINDINGS:  CHEST:  LUNGS ANDLARGE AIRWAYS: Patent central airways. No pulmonary nodules.  PLEURA: No pleural effusion.  VESSELS: Within normal limits.  HEART: Heart size is normal.  No pericardial effusion.  MEDIASTINUM AND BALDEV: No lymphadenopathy.  CHEST WALL AND LOWER NECK: Within normal limits.    ABDOMEN AND PELVIS:  LIVER: Within normal limits.  BILE DUCTS: Normal caliber.  GALLBLADDER: Within normal limits.  SPLEEN: Within normal limits.  PANCREAS: Within normal limits.  ADRENALS: A new 5 cm low-density right adrenalnodule.  KIDNEYS/URETERS: Within normal limits.    BLADDER: Martinez catheter terminates in the prostate.  REPRODUCTIVE ORGANS: Prostate is enlarged.    BOWEL: No bowel obstruction. The appendix is normal.  Fluid-filled large   bowel.  PERITONEUM/RETROPERITONEUM: Within normal limits.  VESSELS:  Within normal limits.  ABDOMINAL WALL: Within normal limits.  BONES: Unchanged heterogeneous lesion in the right humerus.    IMPRESSION: Malpositioned Martinez catheter terminating in the prostate.    New 5 cm low-density right adrenal nodule; recommend MRI.        --- End of Report --    LIDA VANN MD; Attending Radiologist  This document has been electronically signed. Nov 25 2024  7:21PM (11-25-24 @ 18:38)  CT Head No Cont:   ACC: [id number] EXAM:  CT CERVICAL SPINE   ORDERED BY: RADHA WALKER     ACC: [id number] EXAM:  CT BRAIN   ORDERED BY: RADHA WALKER     PROCEDURE DATE:  11/25/2024          INTERPRETATION:  CLINICAL INFORMATION: fall and syncope , weakness,   melanoma with brain metastases      Brain CT:     5mm axial sections of the brain were obtained from base to vertex,   without the intravenous administration of contrast material. Coronal and   sagittal computer generated reconstructed as are available.    No prior brain imaging is available for comparison.    Ventricular and sulcal prominence is consistent with mild atrophy.      The ventricles and sulci are prominent consistent age appropriate   involutional changes. Small vessel white matter ischemic changes are   noted. There is a 2 cm left frontal parenchymal mass with vasogenic edema   which likely represents metastatic melanoma in view of the history. This   as well could contain a small amount of hemorrhage. Adjacent vasogenic   edema minimally compresses the body of the left lateral ventricle. There   is no midline shift. Bone window examination is unremarkable.      Cervical spine CT:    Serial thin sections on a multi slice scanner were obtained through the   cervical spine from the C1 to the T2-3 level in a stacked axial fashion   reformatted at 1.25 mm sections with sagittal and coronal computer   generated reconstructed views.    There is normal alignment of the vertebral bodies and facet joints. There   is straightening of the normal cervical lordosis. There is large anterior   ossification from C3-4 through T3-3 suggesting ankylosing spondylitis   versus DISH. There is also ossification of the posterior longitudinal   ligament at the C4-5 level. No acutefractures or dislocations are   identified. There is mild spinal stenosis at C4-5. Uncal vertebral joint   and facet degenerative changes are identified. There is no prevertebral   soft tissue swelling. No lytic or blastic lesions are identified.    IMPRESSION:    Brain CT: Left frontal peripheral high density lesion consistent with   metastatic melanoma, cannot exclude a small amount of hemorrhage.   Vasogenic edema with mild mass effect on the left lateral ventricle.    Cervical spine CT: Anterior ossification suggesting DISH or ankylosing   spondylitis. Degenerative changes. No acute fractures or dislocations.        Dr. Wasserman discussed these findings with Dr. Walker on 11/25/2024 6:49 PM   with read back.    --- End of Report ---    JEM WASSERMAN MD; Attending Radiologist  This document has been electronically signed. Nov 25 2024  6:52PM (11-25-24 @ 18:28)

## 2024-11-27 NOTE — PROGRESS NOTE ADULT - ASSESSMENT
75 y/o M with PMHx of aortic aneurysm, chronic lower back pain, HLD, HTN, OA, metastatic melanoma on Biologics presents to the ED c/o diarrhea x1 month, severe at times having more than 10 bms per today. Vomited yesterday, and became weak having 3 falls injuring his head. Today pt was so weak he couldn't get up, so he called EMS. EMS found him to be hypotensive, awake, and alert. Pt also noted that he passed out today. BIB EMS, hypotensive with rpaid heart beat. PT with no chest pain or SOB, no pain anywhere.    Shock, r/o septic shock  Hypovolemia. NEEL/ATN. Diarreah   New onset AF  Elevated Trop, suspect demand ischemia  Brain Lesion cannot r/o hemmorrhage so AC on hold awating MRI brain today   Hyperlipidemia  Aortic Aneurysm (Last measurement 5.4cm in size)  Electrolyte Abnormalities    CCU Care  Monitor on tele  No AC for now given cannot exclude hemmorhage on CT, awat further imaging  Now that SBP better can try IV diltiazem D/W Dr Ojeda     Echocardiogram shows normal LVEF

## 2024-11-27 NOTE — PROGRESS NOTE ADULT - ASSESSMENT
A/P: 74 male who is presenting with massive diarrhea, NEEL, hypovolumic Shock, new onset AFIB  Metastatic Melanoma with a Brain mass  HTN  HLD  Obesity    Plan:  CCU    PULM: Stable    Cardio: Hypotensive from hypovolumic shock, continue to match I/Os, Continue to wean Pressors, New onset AFIB. Once Off Pressors start BBx.  No AC at this time awaiting Neuro Surg recommendation about the hemorrhagic met, MRI today, cardio following, ECHO    Renal: NEEL likely from vol depletion, Continue LR at 100, aggressively replace lytes, repeat BMP    GI: Send Stool Culture and GI PCR negative, starting Infliximab for Drug induced Colitis     ID: Consult noted, off antibiotics    ONC: Awaiting records form [facility] and recent sigmoidoscopy    Neuro Surg: Possible MRI brain    Venodynes
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
## 2024-11-27 NOTE — PROGRESS NOTE ADULT - ASSESSMENT
75 yo with hx of metastatic melanoma undergoing tx with msk admitted with 1 month of severe diarrhea with dec po intake   NEEL due to ATN/Volume depletion   Hyponatremia, depletional   Hypokalemia, depletional   hypophos, depletional     REC  - agree with LR  - KCL po/IV replacement   - PO po4 replacement   - fu trend of uop  - no ivc  - no nsaid   emmett cr     11/27 MK   - NEEL resovled   - hyponatremia ,     hypokalemia   hypophos     po/iv replacement   - drug induced coliits     methylprednisone and infliximab   will sign off, please call with questions  emmett cr

## 2024-11-27 NOTE — PROGRESS NOTE ADULT - ASSESSMENT
75 yo m pmhx aortic aneurysm, chronic lower back pain, HTN, HLD, OA, Metastatic melanoma admitted with severe diarrhea 2/2 keytruda, dehydration, NEEL and electrolyte abnormalities.      NEURO: no active issues  CV: shock state requiring vasopressor therapy, weaned off phenylephrine around ~0200.  Close HD monitoring aggressive crystalloid resuscitation as tolerated  RESP: No active issues  RENAL: neel resolving, avoid nephrotoxic meds, renally dose meds, trend urine output, bun/cr and electrolytes. replace lytes as needed.  aggressive electrolyte replacement.  pending new medication for colitis  GI: regular diet  ENDO: no active issues  ID: [email or web address] for coverage   DISPO: Full code     Critical Care time: 35 mins assessing presenting problems of acute illness that poses high probability of life threatening deterioration or end organ damage/dysfunction.  Medical decision making including Initiating plan of care, reviewing data, reviewing radiology, discussing with multidisciplinary team, non inclusive of procedures, discussing goals of care with patient/family    DATE OF DOCUMENTATION EQUIVALENT TO DATE OF SERVICES RENDERED     DATE OF ENTRY OF THIS NOTE IS EQUAL TO THE DATE OF SERVICES RENDERED
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
## 2024-11-28 LAB
ANION GAP SERPL CALC-SCNC: 5 MMOL/L — SIGNIFICANT CHANGE UP (ref 5–17)
BUN SERPL-MCNC: 31 MG/DL — HIGH (ref 7–23)
CALCIUM SERPL-MCNC: 8 MG/DL — LOW (ref 8.5–10.1)
CHLORIDE SERPL-SCNC: 106 MMOL/L — SIGNIFICANT CHANGE UP (ref 96–108)
CO2 SERPL-SCNC: 21 MMOL/L — LOW (ref 22–31)
CREAT SERPL-MCNC: 1.02 MG/DL — SIGNIFICANT CHANGE UP (ref 0.5–1.3)
CULTURE RESULTS: SIGNIFICANT CHANGE UP
EGFR: 77 ML/MIN/1.73M2 — SIGNIFICANT CHANGE UP
GLUCOSE SERPL-MCNC: 102 MG/DL — HIGH (ref 70–99)
HCT VFR BLD CALC: 28.5 % — LOW (ref 39–50)
HGB BLD-MCNC: 9.7 G/DL — LOW (ref 13–17)
MAGNESIUM SERPL-MCNC: 2.2 MG/DL — SIGNIFICANT CHANGE UP (ref 1.6–2.6)
MCHC RBC-ENTMCNC: 27.2 PG — SIGNIFICANT CHANGE UP (ref 27–34)
MCHC RBC-ENTMCNC: 34 G/DL — SIGNIFICANT CHANGE UP (ref 32–36)
MCV RBC AUTO: 80.1 FL — SIGNIFICANT CHANGE UP (ref 80–100)
PHOSPHATE SERPL-MCNC: 2 MG/DL — LOW (ref 2.5–4.5)
PLATELET # BLD AUTO: 157 K/UL — SIGNIFICANT CHANGE UP (ref 150–400)
POTASSIUM SERPL-MCNC: 4 MMOL/L — SIGNIFICANT CHANGE UP (ref 3.5–5.3)
POTASSIUM SERPL-SCNC: 4 MMOL/L — SIGNIFICANT CHANGE UP (ref 3.5–5.3)
RBC # BLD: 3.56 M/UL — LOW (ref 4.2–5.8)
RBC # FLD: 13.5 % — SIGNIFICANT CHANGE UP (ref 10.3–14.5)
SODIUM SERPL-SCNC: 132 MMOL/L — LOW (ref 135–145)
SPECIMEN SOURCE: SIGNIFICANT CHANGE UP
WBC # BLD: 26.73 K/UL — HIGH (ref 3.8–10.5)
WBC # FLD AUTO: 26.73 K/UL — HIGH (ref 3.8–10.5)

## 2024-11-28 PROCEDURE — 99233 SBSQ HOSP IP/OBS HIGH 50: CPT

## 2024-11-28 PROCEDURE — 70553 MRI BRAIN STEM W/O & W/DYE: CPT | Mod: 26

## 2024-11-28 PROCEDURE — 99231 SBSQ HOSP IP/OBS SF/LOW 25: CPT | Mod: FS

## 2024-11-28 RX ORDER — SODIUM,POTASSIUM PHOSPHATES 278-250MG
2 POWDER IN PACKET (EA) ORAL
Refills: 0 | Status: COMPLETED | OUTPATIENT
Start: 2024-11-28 | End: 2024-11-29

## 2024-11-28 RX ORDER — HEPARIN SODIUM 5000 [USP'U]/.5ML
15 INJECTION, SOLUTION INTRAVENOUS; SUBCUTANEOUS ONCE
Refills: 0 | Status: COMPLETED | OUTPATIENT
Start: 2024-11-28 | End: 2024-11-28

## 2024-11-28 RX ADMIN — HEPARIN SODIUM 62.5 MILLIMOLE(S): 5000 INJECTION, SOLUTION INTRAVENOUS; SUBCUTANEOUS at 07:48

## 2024-11-28 RX ADMIN — Medication 100 MILLILITER(S): at 04:26

## 2024-11-28 RX ADMIN — Medication 2 PACKET(S): at 21:10

## 2024-11-28 RX ADMIN — PANTOPRAZOLE SODIUM 40 MILLIGRAM(S): 40 TABLET, DELAYED RELEASE ORAL at 06:37

## 2024-11-28 RX ADMIN — ROSUVASTATIN CALCIUM 10 MILLIGRAM(S): 5 TABLET, FILM COATED ORAL at 21:10

## 2024-11-28 RX ADMIN — CHLORHEXIDINE GLUCONATE 1 APPLICATION(S): 1.2 RINSE ORAL at 06:03

## 2024-11-28 RX ADMIN — Medication 125 MILLIGRAM(S): at 05:25

## 2024-11-28 RX ADMIN — METOPROLOL TARTRATE 25 MILLIGRAM(S): 100 TABLET, FILM COATED ORAL at 05:25

## 2024-11-28 RX ADMIN — Medication 2 PACKET(S): at 08:14

## 2024-11-28 RX ADMIN — METOPROLOL TARTRATE 25 MILLIGRAM(S): 100 TABLET, FILM COATED ORAL at 21:09

## 2024-11-28 RX ADMIN — METOPROLOL TARTRATE 25 MILLIGRAM(S): 100 TABLET, FILM COATED ORAL at 13:29

## 2024-11-28 NOTE — PROGRESS NOTE ADULT - ASSESSMENT
73 y/o M who follows at [facility] with h/o metastatic melanoma on immunotherapy, aortic aneurysm, chronic lower back pain, HLD, HTN, OA,  presents to the ED c/o diarrhea x1 month.    # metastatic melanoma- mets to brain and adrenal gland   - follows at [facility]   - diagnosed via adrenal biopsy 10/15/24   - pt given dose of nivo/ipilimumab on 11/8/24   - 11/5/24 CT c/a/p uncahnged confluent mesenteric adenopathy up to 4 cm near midline with small bowelmural thickening noted     # small ICH- known brain mets   - last MRI brain 11/5/24 at [facility] [place] with multiple brain lesions c/w metastatic disease some of which have hemorrhagic features including the dominant left middle frontal lesion. LEft superficial occipital lesion is probably cortical, possibly leptomeningeal   - CTH ordered for AMS and Left frontal small cortical - non surgical ICH.  - seen by neurosurgery - MRI brain ordered as well     # diarrhea   - diarrhea likely 2/2 reaction with medications  - he was started on prednisone 80 mg qd on 11/12/24 with rapid improvement and was tapered down to 60 mg now with worsening diarrhea  - infectious stool studies were negative on 11/13/24 and fecal calprotetin was 349 - stool culture in hospital negative   - c/w methylprednisolone 1 mg/kg qd   - s/p infliximab: 5 mg/kg (660 mg) 11/27- a second dose may be repeated 2 weeks later, and a third dose may be considered at 6 weeks if needed; use in combination with a corticosteroid  - replete lytes  - stool output has decreased- still with rectal tube in place     # hypotension- improved, now off pressors - can remove fonseca   # afib- cardio following - HR  this am     will follow daily and communicate with [facility]

## 2024-11-28 NOTE — PROGRESS NOTE ADULT - ASSESSMENT
A/P: 74 male who is presenting with massive diarrhea, NEEL, hypovolumic Shock, new onset AFIB  Metastatic Melanoma with a Brain mass  HTN  HLD  Obesity    Plan:  CCU    PULM: Stable    Cardio:  No AC at this time awaiting Neuro Surg recommendation about the hemorrhagic met, MRI today, cardio following, Lopressor 25 q6h    Renal: Continue LR at 100, aggressively replace lytes, D/C Martinez    GI: Send Stool Culture and GI PCR negative, started Infliximab for Drug induced Colitis and Steroids.  Will D/C fecal collection system    ID: Consult noted, off antibiotics    ONC: Awaiting records form [facility] and recent sigmoidoscopy    Neuro Surg:  MRI brain today    Venodynes

## 2024-11-28 NOTE — PROGRESS NOTE ADULT - ASSESSMENT
75 y/o M with PMHx of aortic aneurysm, chronic lower back pain, HLD, HTN, OA, metastatic melanoma on Biologics presents to the ED c/o diarrhea x1 month, severe at times having more than 10 bms per today. Vomited yesterday, and became weak having 3 falls injuring his head. Today pt was so weak he couldn't get up, so he called EMS. EMS found him to be hypotensive, awake, and alert. Pt also noted that he passed out today. BIB EMS, hypotensive with rpaid heart beat. PT with no chest pain or SOB, no pain anywhere.    Shock, r/o septic shock  Hypovolemia. NEEL/ATN. N/V/D  New onset AF  Elevated Trop, suspect demand ischemia  Brain Lesion cannot r/o hemorrhage  Hyperlipidemia  Aortic Aneurysm (Last measurement 5.4cm in size)  Electrolyte Abnormalities    CCU Care  Monitor on tele  No AC for now given cannot exclude hemmorhage on CT, await further imaging  2Decho with normal LV fxn  Cont Bbl for HR control. BP/HR stable at present.  No CV at this time as pt cannot have a/c.

## 2024-11-28 NOTE — PROGRESS NOTE ADULT - ASSESSMENT
73 y/o M with PMHx of aortic aneurysm, chronic lower back pain, HLD, HTN, OA, metastatic melanoma on Biologics presents to the ED c/o diarrhea x1 month,     # Admitted with sepsis, dehydration, new onset A-fib.   # Known metastatic melanoma with brain met --  [place] [facility] [place] lesions c/w metastatic disease some of which have hemorrhagic features including the dominant left middle frontal lesion. Left superficial occipital lesion is probably cortical, possibly leptomeningeal     No acute neurosurgical intervention  Awaiting MRI brain to assess known brain lesion   Supportive care as per CCU   [place] [facility] [place]     Discussed with Dr. Esteban
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
## 2024-11-28 NOTE — PROGRESS NOTE ADULT - SUBJECTIVE AND OBJECTIVE BOX
HPI: 73 y/o M with PMHx of aortic aneurysm, chronic lower back pain, HLD, HTN, OA, metastatic melanoma on Biologics presents to the ED c/o diarrhea x1 month, severe at times having more than 10 bms per today. Vomited yesterday, and became weak having 3 falls injuring his head. Today pt was so weak he couldn't get up, so he called EMS. EMS found him to be hypotensive, awake, and alert. Pt also noted that he passed out today. BIB EMS, hypotensive with rapid heart beat. PT with no chest pain or SOB, no pain anywhere.  ICU consult for MSOF, hypotension    11/28- Pt seen and examined in the CCU, much improved from initial assessment, awake and alert, sitting up eating breakfast, denies headache, dizziness, or nausea, still having diarrhea secondary to chemo treatment, although improved. New onset A-fib better controlled.    Vital Signs Last 24 Hrs  T(C): 35.7 (28 Nov 2024 09:49), Max: 36.1 (27 Nov 2024 12:30)  T(F): 96.3 (28 Nov 2024 09:49), Max: 97 (27 Nov 2024 12:30)  HR: 99 (28 Nov 2024 12:00) (87 - 126)  BP: 106/59 (28 Nov 2024 12:00) (104/69 - 142/94)  BP(mean): 73 (28 Nov 2024 12:00) (72 - 108)  RR: 17 (28 Nov 2024 12:00) (13 - 25)  SpO2: 96% (28 Nov 2024 09:00) (95% - 100%)    Parameters below as of 27 Nov 2024 18:00  Patient On (Oxygen Delivery Method): room air    MEDICATIONS  (STANDING):  chlorhexidine 2% Cloths 1 Application(s) Topical <User Schedule>  lactated ringers. 1000 milliLiter(s) (100 mL/Hr) IV Continuous <Continuous>  methylPREDNISolone sodium succinate Injectable 125 milliGRAM(s) IV Push daily  metoprolol tartrate 25 milliGRAM(s) Oral every 8 hours  pantoprazole    Tablet 40 milliGRAM(s) Oral before breakfast  potassium phosphate / sodium phosphate Powder (PHOS-NaK) 2 Packet(s) Oral two times a day  rosuvastatin 10 milliGRAM(s) Oral at bedtime    MEDICATIONS  (PRN):  acetaminophen     Tablet .. 650 milliGRAM(s) Oral every 6 hours PRN Temp greater or equal to 38C (100.4F)    ROS: pertinent positives in HPI, all other ROS were reviewed and are negative     PHYSICAL EXAM:  Constitutional: awake and alert  HEENT: PERRLA, EOMI, hearing intact   Neck: Supple  Respiratory: Breath sounds are clear bilaterally  Cardiovascular: S1 and S2,  irregular rhythm  Gastrointestinal: soft, nontender, obese   Extremities:  no edema  Vascular: Carotid Bruit - no  Musculoskeletal: no joint swelling/tenderness, no abnormal movements  Skin: No rashes    Neurological exam:  HF: A x O x 3. Appropriately interactive, normal affect. Speech fluent, No Aphasia or paraphasic errors. Naming /repetition intact   CN: MELLISSA, EOMI, VFF, facial sensation normal, no NLFD, tongue midline, Palate moves equally, SCM equal bilaterally  Motor: No pronator drift, Strength 5/5 in all 4 ext, normal bulk and tone, no tremor, rigidity or bradykinesia.    Sens: Intact to light touch / PP/ VS/ JS    Reflexes: Symmetric and normal, downgoing toes b/l  Coord:  No FNFA, dysmetria, SACHIN intact   Gait/Balance: Not tested     LABS:                         9.7    26.73 )-----------( 157      ( 28 Nov 2024 05:23 )             28.5     11-28    132[L]  |  106  |  31[H]  ----------------------------<  102[H]  4.0   |  21[L]  |  1.02    Ca    8.0[L]      28 Nov 2024 05:23  Phos  2.0     11-28  Mg     2.2     11-28    TPro  5.2[L]  /  Alb  2.1[L]  /  TBili  0.5  /  DBili  x   /  AST  17  /  ALT  16  /  AlkPhos  53  11-26    LIVER FUNCTIONS - ( 26 Nov 2024 18:49 )  Alb: 2.1 g/dL / Pro: 5.2 gm/dL / ALK PHOS: 53 U/L / ALT: 16 U/L / AST: 17 U/L / GGT: x           RADIOLOGY:  < from: CT Head No Cont (11.25.24 @ 18:28) >  IMPRESSION:  Brain CT: Left frontal peripheral high density lesion consistent with   metastatic melanoma, cannot exclude a small amount of hemorrhage.   Vasogenic edema with mild mass effect on the left lateral ventricle.    Cervical spine CT: Anterior ossification suggesting DISH or ankylosing   spondylitis. Degenerative changes. No acute fractures or dislocations.
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
## 2024-11-28 NOTE — PROGRESS NOTE ADULT - ASSESSMENT
A:    74yMale  HD #    Here for:    1. Shock, POA  2. Hypotension  3. ? sepsis  4. NEEL  5. MSOF  6. Hypokalemia    This patient requires critical care for support of one or more vital organ systems with a high probability of imminent or life threatening deterioration in his/her condition    P:    Shock, hypovolemic +/- septic  In setting of metastatic melanoma on checkpoint inhibitor, heavy diarrhea for 1 month, has not been able to keep up with PO intake    MRI shows hemorrhagic component of brain lesion    Sepsis bundle  f/u Cx's, white count, fever curve  Broad spectrum abx  Oncology consult  HD monitoring, ? new onset AF; Cards consult, will order echo  IS, OOB as able  NEEL, suspect pre renal + ATN; fonseca for I/O's, avoid renal toxic agents  Renal, cards, heme/onc consults  Replete K+ aggressively  Continue IVF w/ KCL in bag  VTE ppx  PIV access, minimze invasive catheters  May require rectal tube device      Dispo: Cont critical care.    Date of entry of this note is equal to the date of services rendered    TOTAL CRITICAL CARE TIME: 32 minutes minutes (EXCLUSIVE of any non bundled procedures)    Note: This is a critically ill patient. Time spent has been in salvage of life, limb and vital organ systems. This time INCLUDES time spent directly as this patient's bedside with evaluation and management, review of chart including review of laboratory and imaging studies, interpretation of vital signs and cardiac output measurements, any necessary ventilator management, and time spent discussing plan of care with patient and family, including goals of care discussion. This also includes time spent in multidisciplinary discussion with care team and various consultants to optimize treatment plan. This time may NOT include various procedures, which will be noted separately
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
## 2024-11-29 LAB
ANION GAP SERPL CALC-SCNC: 5 MMOL/L — SIGNIFICANT CHANGE UP (ref 5–17)
BUN SERPL-MCNC: 37 MG/DL — HIGH (ref 7–23)
CALCIUM SERPL-MCNC: 7.9 MG/DL — LOW (ref 8.5–10.1)
CHLORIDE SERPL-SCNC: 109 MMOL/L — HIGH (ref 96–108)
CO2 SERPL-SCNC: 21 MMOL/L — LOW (ref 22–31)
CREAT SERPL-MCNC: 1.25 MG/DL — SIGNIFICANT CHANGE UP (ref 0.5–1.3)
EGFR: 60 ML/MIN/1.73M2 — SIGNIFICANT CHANGE UP
GLUCOSE SERPL-MCNC: 118 MG/DL — HIGH (ref 70–99)
HCT VFR BLD CALC: 29.3 % — LOW (ref 39–50)
HGB BLD-MCNC: 9.9 G/DL — LOW (ref 13–17)
MAGNESIUM SERPL-MCNC: 2 MG/DL — SIGNIFICANT CHANGE UP (ref 1.6–2.6)
MCHC RBC-ENTMCNC: 27.4 PG — SIGNIFICANT CHANGE UP (ref 27–34)
MCHC RBC-ENTMCNC: 33.8 G/DL — SIGNIFICANT CHANGE UP (ref 32–36)
MCV RBC AUTO: 81.2 FL — SIGNIFICANT CHANGE UP (ref 80–100)
PHOSPHATE SERPL-MCNC: 2.3 MG/DL — LOW (ref 2.5–4.5)
PLATELET # BLD AUTO: 171 K/UL — SIGNIFICANT CHANGE UP (ref 150–400)
POTASSIUM SERPL-MCNC: 3.8 MMOL/L — SIGNIFICANT CHANGE UP (ref 3.5–5.3)
POTASSIUM SERPL-SCNC: 3.8 MMOL/L — SIGNIFICANT CHANGE UP (ref 3.5–5.3)
RBC # BLD: 3.61 M/UL — LOW (ref 4.2–5.8)
RBC # FLD: 13.7 % — SIGNIFICANT CHANGE UP (ref 10.3–14.5)
SODIUM SERPL-SCNC: 135 MMOL/L — SIGNIFICANT CHANGE UP (ref 135–145)
WBC # BLD: 26.88 K/UL — HIGH (ref 3.8–10.5)
WBC # FLD AUTO: 26.88 K/UL — HIGH (ref 3.8–10.5)

## 2024-11-29 PROCEDURE — 99233 SBSQ HOSP IP/OBS HIGH 50: CPT

## 2024-11-29 RX ADMIN — METOPROLOL TARTRATE 25 MILLIGRAM(S): 100 TABLET, FILM COATED ORAL at 14:53

## 2024-11-29 RX ADMIN — Medication 2 PACKET(S): at 21:58

## 2024-11-29 RX ADMIN — Medication 125 MILLIGRAM(S): at 06:00

## 2024-11-29 RX ADMIN — ROSUVASTATIN CALCIUM 10 MILLIGRAM(S): 5 TABLET, FILM COATED ORAL at 21:57

## 2024-11-29 RX ADMIN — METOPROLOL TARTRATE 25 MILLIGRAM(S): 100 TABLET, FILM COATED ORAL at 06:02

## 2024-11-29 RX ADMIN — PANTOPRAZOLE SODIUM 40 MILLIGRAM(S): 40 TABLET, DELAYED RELEASE ORAL at 06:03

## 2024-11-29 RX ADMIN — CHLORHEXIDINE GLUCONATE 1 APPLICATION(S): 1.2 RINSE ORAL at 06:04

## 2024-11-29 RX ADMIN — METOPROLOL TARTRATE 25 MILLIGRAM(S): 100 TABLET, FILM COATED ORAL at 21:58

## 2024-11-29 RX ADMIN — Medication 2 PACKET(S): at 11:13

## 2024-11-29 RX ADMIN — Medication 100 MILLILITER(S): at 00:15

## 2024-11-29 NOTE — PHYSICAL THERAPY INITIAL EVALUATION ADULT - LIVES WITH, PROFILE
Pt lives home alone in a home w/ 5 ERNESTO and resides on the main floor. States he has a nephew that comes over daily and has a lot of neighbors to assist/alone

## 2024-11-29 NOTE — CHART NOTE - NSCHARTNOTEFT_GEN_A_CORE
MRI Brain with and without contrast was done interpreted as follows  FINDINGS:  There is a 1.5 x 0.7 cm high T1 signal lesion in the left frontal lobe   with surrounding vasogenic edema. There is enhancement seen medially.    There are additional scattered subcentimeter high T1 signal enhancing   lesions in the bilateral frontal, bilateral parietal, right temporal   lobes.    There is mild diffuse parenchymal volume loss.    There are nonspecific T2 prolongation signal abnormalities in the   periventricular subcortical white matter likely related to mild chronic   microvascular ischemic changes.    No midline shift. No hydrocephalus.    The visualized paranasal sinuses are well-aerated    IMPRESSION:  Bilateral enhancing high T1 signal lesions as described above compatible   with hyperdense and/or hemorrhagic metastases.  No acute infarct.      Recommendation:  Management and workup as per medical oncology  Neurosurgery to sign off at this time, please re-consult PRN  Patient follows at [facility]  Case d/w Dr. Esteban

## 2024-11-29 NOTE — PROGRESS NOTE ADULT - ASSESSMENT
73 y/o M who follows at [facility] with h/o metastatic melanoma on immunotherapy, aortic aneurysm, chronic lower back pain, HLD, HTN, OA,  presents to the ED c/o diarrhea x1 month.    # metastatic melanoma- mets to brain and adrenal gland   - follows at [facility]   - diagnosed via adrenal biopsy 10/15/24   - pt given dose of nivo/ipilimumab on 11/8/24   - 11/5/24 CT c/a/p uncahnged confluent mesenteric adenopathy up to 4 cm near midline with small bowelmural thickening noted     # small ICH- known brain mets   - last MRI brain 11/5/24 at [facility] with multiple brain lesions c/w metastatic disease some of which have hemorrhagic features including the dominant left middle frontal lesion. LEft superficial occipital lesion is probably cortical, possibly leptomeningeal   - CTH ordered for AMS and Left frontal small cortical - non surgical ICH.  - seen by neurosurgery - MRI brain 11/28 with b/l enhancing high T1 signal lesions as described compatible with hyperdesne and/or hemorrhagic mets, no acute infarct     # diarrhea   - diarrhea likely 2/2 reaction with medications  - he was started on prednisone 80 mg qd on 11/12/24 with rapid improvement and was tapered down to 60 mg now with worsening diarrhea  - infectious stool studies were negative on 11/13/24 and fecal calprotetin was 349 - stool culture in hospital negative   - c/w methylprednisolone 1 mg/kg qd   - s/p infliximab: 5 mg/kg (660 mg) 11/27- a second dose may be repeated 2 weeks later, and a third dose may be considered at 6 weeks if needed; use in combination with a corticosteroid  - replete lytes  - stool output has decreased - pt to be transferred out of ccu today     # hypotension- improved, now off pressors  # afib- cardio following - HR 80-90-this am     will follow daily and communicate with [facility]

## 2024-11-29 NOTE — PHYSICAL THERAPY INITIAL EVALUATION ADULT - DIAGNOSIS, PT EVAL
diarrhea, weakness, Metastatic Melanoma w/ Brain Mass diarrhea, weakness, Metastatic Melanoma w/ Brain Mass, Shock, hypovolemic +/- septic

## 2024-11-29 NOTE — PHYSICAL THERAPY INITIAL EVALUATION ADULT - GENERAL OBSERVATIONS, REHAB EVAL
Pt seen in CCU, off monitors, HR range  this session. NO SOB noted, denies pain. Awaiting downgrade to the floors

## 2024-11-29 NOTE — PHYSICAL THERAPY INITIAL EVALUATION ADULT - PERTINENT HX OF CURRENT PROBLEM, REHAB EVAL
74 male who is presenting with massive diarrhea, became weak having 3 falls injuring his head. pt was so weak he couldn't get up-called EMS. EMS found him to be hypotensive, awake, and alert. Pt also noted that he passed out today. BIB EMS, hypotensive with rapid heart beat.   h/o NEEL, hypovolumic Shock, new onset AFIB Metastatic Melanoma with a Brain mass

## 2024-11-29 NOTE — PROGRESS NOTE ADULT - ASSESSMENT
A/P: 74 male who is presenting with massive diarrhea, NEEL, hypovolumic Shock, new onset AFIB  Metastatic Melanoma with a Brain mass  HTN  HLD  Obesity    Plan:  CCU    PULM: Stable    Cardio:  No AC at this time. MRI showing Bilateral enhancing high T1 signal lesions as described above compatible   with hyperdense and/or hemorrhagic metastases. Continue Lopressor for rate control       Renal: Continue LR at 100, aggressively replace lytes    GI: Started Infliximab for Drug induced Colitis and Steroids.     ID: Consult noted, off antibiotics    ONC: Dr. Rodney Following    Neuro Surg:  MRI brain noted    Venodynes    OOB, PT    Transfer to tele     A/P: 74 male who is presenting with massive diarrhea, NEEL, hypovolumic Shock, new onset AFIB  Metastatic Melanoma with a Brain mass  HTN  HLD  Obesity    Plan:  CCU    PULM: Stable    Cardio:  No AC at this time. MRI showing Bilateral enhancing high T1 signal lesions as described above compatible   with hyperdense and/or hemorrhagic metastases. Continue Lopressor for rate control       Renal: Continue LR at 100, aggressively replace lytes    GI: Started Infliximab for Drug induced Colitis and Steroids.     ID: Consult noted, off antibiotics    ONC: Dr. Rodney Following    Neuro Surg:  MRI brain noted    Venodynes    OOB, PT    Patient will require a heavy duty barri walker for safe mobility    Transfer to tele

## 2024-11-29 NOTE — PROGRESS NOTE ADULT - RESPIRATORY
normal/clear to auscultation bilaterally/no wheezes/no rales/no rhonchi
clear to auscultation bilaterally/no wheezes/no rales/no rhonchi
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
## 2024-11-29 NOTE — PROGRESS NOTE ADULT - ASSESSMENT
75 y/o M with PMHx of obesity aortic aneurysm, chronic lower back pain, HLD, HTN, OA, metastatic melanoma to brain on Biologics presents to the ED c/o diarrhea x1 month. presenting with massive diarrhea, generalized weakness, found to have NEEL, hypovolemic Shock, new onset AFIB, admitted for:    #Hypovolemic shock (POA)  #Diarrhea likely 2/2 immunotherapy induced colitis   #Fever, leukocytosis, elevated lactate due to above - NO sepsis   - s/p CCU care requiring pressors for BP support, aggressive fluid resuscitation   - CT chest/abd/pelvis   - Recently underwent sigmoidoscopy ~2 weeks ago at [facility] [place], path showed immunotherapy-induced colitis, was supposed to start entyvio outpatient   - GI PCR, RVP and stool culture Negative  - ID consulted, no obvious acute infection, monitoring off abx  - GI consulted Dr. Cisse  - Received Infliximab x1  for drug induced colitis   - Continue LR @100ml/hr  - Continue IV solumedrol 125mg QD   - rectal tube removed, monitor BMs  - PT/OOB    #New onset Afib with RVR   #Elevated troponin, suspect demand ischemia  - tele monitoring  - Continue lopressor for rate control, titrate accordingly   - Cardiology following Dr. Ya  - TTE as above  - No AC at this time, MRI brain showing Bilateral enhancing high T1 signal lesions compatible with hyperdense and/or hemorrhagic metastases    #NEEL - likely due to ATN/Volume depletion   #Hyponatremia   - Cr 4.38 on arrival --> 1.25 today   - Nephrology consulted Dr Mendes, recs appreciated   - IVF as above    #Metastatic melanoma to brain, adrenal gland  - CT head, MRI brain as above  - heme/onc following Dr. Rodney   - NSGY consulted for findings, no acute neurosurgical intervention     #Hypophosphatemia, hypokalemia,   - trend and replete PRN    #HLD   - crestor     #Obesity   - nutrition consult    #DVT ppx  - SCDs        > 76 minutes of total time met or exceeded on this patient encounter. Time spent excludes time spent on separately reportable services/teaching during the encounter.
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
## 2024-11-30 LAB
ANION GAP SERPL CALC-SCNC: 7 MMOL/L — SIGNIFICANT CHANGE UP (ref 5–17)
BUN SERPL-MCNC: 35 MG/DL — HIGH (ref 7–23)
CALCIUM SERPL-MCNC: 7.8 MG/DL — LOW (ref 8.5–10.1)
CHLORIDE SERPL-SCNC: 110 MMOL/L — HIGH (ref 96–108)
CO2 SERPL-SCNC: 21 MMOL/L — LOW (ref 22–31)
CREAT SERPL-MCNC: 0.97 MG/DL — SIGNIFICANT CHANGE UP (ref 0.5–1.3)
EGFR: 81 ML/MIN/1.73M2 — SIGNIFICANT CHANGE UP
GAMMA INTERFERON BACKGROUND BLD IA-ACNC: 0.03 IU/ML — SIGNIFICANT CHANGE UP
GLUCOSE BLDC GLUCOMTR-MCNC: 106 MG/DL — HIGH (ref 70–99)
GLUCOSE SERPL-MCNC: 96 MG/DL — SIGNIFICANT CHANGE UP (ref 70–99)
HCT VFR BLD CALC: 29.3 % — LOW (ref 39–50)
HGB BLD-MCNC: 9.7 G/DL — LOW (ref 13–17)
M TB IFN-G BLD-IMP: NEGATIVE — SIGNIFICANT CHANGE UP
M TB IFN-G CD4+ BCKGRND COR BLD-ACNC: 0 IU/ML — SIGNIFICANT CHANGE UP
M TB IFN-G CD4+CD8+ BCKGRND COR BLD-ACNC: 0 IU/ML — SIGNIFICANT CHANGE UP
MAGNESIUM SERPL-MCNC: 2.1 MG/DL — SIGNIFICANT CHANGE UP (ref 1.6–2.6)
MCHC RBC-ENTMCNC: 27.2 PG — SIGNIFICANT CHANGE UP (ref 27–34)
MCHC RBC-ENTMCNC: 33.1 G/DL — SIGNIFICANT CHANGE UP (ref 32–36)
MCV RBC AUTO: 82.3 FL — SIGNIFICANT CHANGE UP (ref 80–100)
PHOSPHATE SERPL-MCNC: 2.8 MG/DL — SIGNIFICANT CHANGE UP (ref 2.5–4.5)
PLATELET # BLD AUTO: 172 K/UL — SIGNIFICANT CHANGE UP (ref 150–400)
POTASSIUM SERPL-MCNC: 4.2 MMOL/L — SIGNIFICANT CHANGE UP (ref 3.5–5.3)
POTASSIUM SERPL-SCNC: 4.2 MMOL/L — SIGNIFICANT CHANGE UP (ref 3.5–5.3)
QUANT TB PLUS MITOGEN MINUS NIL: 0.86 IU/ML — SIGNIFICANT CHANGE UP
RBC # BLD: 3.56 M/UL — LOW (ref 4.2–5.8)
RBC # FLD: 13.7 % — SIGNIFICANT CHANGE UP (ref 10.3–14.5)
SODIUM SERPL-SCNC: 138 MMOL/L — SIGNIFICANT CHANGE UP (ref 135–145)
WBC # BLD: 24.88 K/UL — HIGH (ref 3.8–10.5)
WBC # FLD AUTO: 24.88 K/UL — HIGH (ref 3.8–10.5)

## 2024-11-30 PROCEDURE — 99233 SBSQ HOSP IP/OBS HIGH 50: CPT

## 2024-11-30 RX ORDER — ZINC SULFATE 50(220)MG
220 TABLET ORAL DAILY
Refills: 0 | Status: DISCONTINUED | OUTPATIENT
Start: 2024-11-30 | End: 2024-12-02

## 2024-11-30 RX ORDER — LANOLIN ALCOHOL/MO/W.PET/CERES
100 CREAM (GRAM) TOPICAL DAILY
Refills: 0 | Status: DISCONTINUED | OUTPATIENT
Start: 2024-11-30 | End: 2024-12-02

## 2024-11-30 RX ADMIN — CHLORHEXIDINE GLUCONATE 1 APPLICATION(S): 1.2 RINSE ORAL at 05:48

## 2024-11-30 RX ADMIN — METOPROLOL TARTRATE 25 MILLIGRAM(S): 100 TABLET, FILM COATED ORAL at 05:48

## 2024-11-30 RX ADMIN — Medication 125 MILLIGRAM(S): at 05:43

## 2024-11-30 RX ADMIN — Medication 220 MILLIGRAM(S): at 17:38

## 2024-11-30 RX ADMIN — ROSUVASTATIN CALCIUM 10 MILLIGRAM(S): 5 TABLET, FILM COATED ORAL at 21:09

## 2024-11-30 RX ADMIN — Medication 1 TABLET(S): at 17:39

## 2024-11-30 RX ADMIN — Medication 100 MILLIGRAM(S): at 17:38

## 2024-11-30 RX ADMIN — METOPROLOL TARTRATE 25 MILLIGRAM(S): 100 TABLET, FILM COATED ORAL at 13:10

## 2024-11-30 RX ADMIN — PANTOPRAZOLE SODIUM 40 MILLIGRAM(S): 40 TABLET, DELAYED RELEASE ORAL at 05:43

## 2024-11-30 RX ADMIN — METOPROLOL TARTRATE 25 MILLIGRAM(S): 100 TABLET, FILM COATED ORAL at 21:09

## 2024-11-30 NOTE — PROGRESS NOTE ADULT - ASSESSMENT
73 yo man with HTN, HLD, aortic aneurysm, osteoarthritis, chronic lower back pain, metastatic melanoma on biologics, followed by [facility], presented for further evaluation of worsening protracted diarrhea, worsening generalized weakness, trouble ambulating due to the weakness so he called EMS. Patient was found to have hypotension, tachycardia, leukocytosis, NEEL, hyponatremia, hypokalemia, elevated lactate, elevated troponin. CT head, C spine, chest, abd, pelvis was performed. CT head demonstrated a L frontal peripheral high density lesion consistent with metastatic melanoma, could not exclude a small amount of hemorrhage. Vasogenic edema with mild mass effect on the left lateral ventricle was also seen. CT c spine showed some anterior ossification. No acute findings. CT chest was unrevealing. CT abdomen, pelvis also was fairly unrevealing. A Martinez that had been inserted in the ER was malpositioned and the large bowel was fluid-filled without obstruction. Patient remained hypotensive and tachycardic despite fluid resuscitation so he was admitted to the ICU for further care. He was ultimately suspected to have immunotherapy induced colitis, received IV steroids and on 11/27 received infliximab. Diarrhea is now improved. Volume status is much improved. NEEL and electrolyte derangements have resolved. He has been transferred to Medicine.     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 Recently underwent [place]moid[place] [facility] showed immunotherapy-induced colitis, was supposed to start entyvio outpatient. CT A/P unrevealing. RVP negative. GI PCR negative. Stool culture negative. Monitoring off antibiotics as per ID. Consulted GI. Started patient on IV steroids. Patient received infliximab x 1 on 11/27. Diarrhea now improving. Stool just starting to become semi formed. Markedly less frequent as well, down to 3 BMs since yesterday evening. Anticipate that he may need a second dose of infliximab in 2 weeks, a third dose may be considered at 6 weeks if needed; use in combination with a corticosteroid.     Hypovolemic shock  In setting of severe protracted diarrhea. Did not initially respond to fluid resuscitation. Required admission to ICU, vasopressor support. Volume status improved. BP stable now, off pressors.     New onset afib RVR  Appreciate input from Cardiology. Remains in afib, rates 90s-100s. 2D echo with normal LVEF. Continue beta blocker. No AC for now given cannot hemorrhagic metastatic disease in brain. Thus, no DCCV for now as patient cannot have AC. Can consider Watchman at a later date.     Elevated troponin  Likely myocardial demand ischemia in setting of SIRS, diarrhea, hypovolemia, hypotension. 2D echo with normal LVEF. Continue beta blocker, statin. Not on antiplatelet presumably because of the possible hemorrhagic brain metastasis.     Elevated lactate  Due to diarrhea, hypovolemia, hypotension. Clinically improved.    NEEL  Likely pre-renal that progressed to ATN. With treatment of the underlying issues, Cr has since normalized.     Hyponatremia  Likely hypovolemic hyponatremia. Patient has since been fluid resuscitated and diarrhea has improved. Na has normalized.     Hypokalemia  Due to protracted diarrhea. Now improved. K now WNL.     Metastatic melanoma  Appreciate Oncology input. Patient follows with [facility]. He has mets to brain and adrenal gland, diagnosed via adrenal biopsy 10/15/24. Patient given dose of nivo/ipilimumab on 11/8/24. Outpatient follow up with [facility] upon discharge.      Small ICH from known brain mets   Last [place] brain 11[place]5/24 [place] [facility] with multiple brain lesions c/w metastatic disease some of which have hemorrhagic features including the dominant left middle frontal lesion. Left superficial occipital lesion is probably cortical, possibly leptomeningeal. MRI brain 11/28 with B/L enhancing high T1 signal lesions as described compatible with hyperdesne and/or hemorrhagic mets, no acute infarct. No neurosurgical intervention as per Neurosurgery.      Severe protein calorie malnutrition  Appreciate input from Nutrition. Liberalized diet to regular. Supplement with Premier Protein Shake BID. MVI with minerals daily. Thiamine daily. Zinc 220 daily.

## 2024-11-30 NOTE — PROGRESS NOTE ADULT - ASSESSMENT
73 y/o M with PMHx of aortic aneurysm, chronic lower back pain, HLD, HTN, OA, metastatic melanoma on Biologics presents to the ED c/o diarrhea x1 month, severe at times having more than 10 bms per today. Vomited yesterday, and became weak having 3 falls injuring his head. Today pt was so weak he couldn't get up, so he called EMS. EMS found him to be hypotensive, awake, and alert. Pt also noted that he passed out today. BIB EMS, hypotensive with rapid heart beat. PT with no chest pain or SOB, no pain anywhere.    Shock, r/o septic shock  Hypovolemia. NEEL/ATN. N/V/D  New onset AF  Elevated Trop, suspect demand ischemia  Brain Lesion cannot r/o hemorrhage  Hyperlipidemia  Aortic Aneurysm (Last measurement 5.4cm in size)  Electrolyte Abnormalities    Tx to 3E. Remains in rate controlled afib on tele.   No AC for now given cannot hemorrhagic metastic disease in brain.   Can consider WATCHMAN at a later date.  2Decho with normal LV fxn  Cont Bbl for HR control. BP/HR stable at present.  No CV at this time as pt cannot have a/c.
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
## 2024-11-30 NOTE — PROGRESS NOTE ADULT - SUBJECTIVE AND OBJECTIVE BOX
Chief Complaint: Protracted worsening diarrhea, dehydration, generalized weakness    Interval Hx: Patient seen and examined. Patient is reporting feeling much improved. Stool is now semi-formed, Had 3 bowel movements since yesterday evening, which is a significant improvement compared to prior. No abdominal pain. No nausea or vomiting. No fever or chills. No other complaints.     ROS: Multi system review is comprehensively negative x 10 systems except as above    Vitals:  T(F): 98.2 (30 Nov 2024 07:32), Max: 98.2 (30 Nov 2024 07:32)  HR: 81 (30 Nov 2024 13:15) (81 - 99)  BP: 119/60 (30 Nov 2024 13:15) (103/59 - 161/75)  RR: 18 (30 Nov 2024 07:32) (18 - 18)  SpO2: 97% (30 Nov 2024 07:32) (97% - 99%) on room air    Exam:  Gen: Comfortable appearing  HEENT: NCAT PERRL EOMI MMM clear oropharynx  Neck: Supple, no JVD, no LAD, no C spine tenderness, no thyromegaly  CVS: s1 s2 normal, RRR  Chest: Normal resp effort, lungs CTA B/L  Abd: Non distended, +BS, soft, non tender  Ext: No extremity tenderness, intact peripheral pulses, normal capillary refill  Skin: Warm, dry  Mood: Humza, pleasant  Neuro: Awake and alert, follows commands, answers questions appropriately    Labs:    POCT glucose 106                        9.7    24.88 )-------( 172             29.3       138  |  110  |  35  ------------------------<  96  4.2   |   21   |  0.97    Ca 7.8 (WNL when corrected for Alb)    Phos  2.8    Mg  2.1    Tprot 5.2   Alb 2.1   Tbili 0.5   AST 17   ALT 16   AlkPhos 53    Troponin 127    Lactate 4.9 -->3.7    UA 11/25: Dark yellow, cloudy, prot 100, gluc neg, trace ket, large bld, neg bili, trace LE, N neg, WBC 9, , no bacteria    Micro:  MRSA/MSSA PCR 11/27: Negative  Quantiferon Plus TB 11/27: Negative  GI PCR 11/26: Negative  Stool culture 11/26: Negative  COVID19, flu, RSV PCR 11/25: Negative  Blood culture x 2 11/25: Negative    Imaging:  MRI brain WWO 11/28: There is a 1.5 x 0.7 cm high T1 signal lesion in the left frontal lobe with surrounding vasogenic edema. There is enhancement seen medially. There are additional scattered subcentimeter high T1 signal enhancing lesions in the bilateral frontal, bilateral parietal, right temporal lobes. These bilateral enhancing high T1 signal lesions as described above are compatible   with hyperdense and/or hemorrhagic metastases. There is mild diffuse parenchymal volume loss. There are nonspecific T2 prolongation signal abnormalities in the periventricular subcortical white matter likely related to mild chronic microvascular ischemic changes. No midline shift. No hydrocephalus. The visualized paranasal sinuses are well-aerated.     CT abd, pelvis WO 11/25: Liver, bile ducts and gallbladder WNL. Spleen, pancreas WNL. New 5 cm low-density right adrenal nodule. Kidneys and ureters WNL. Martinez catheter terminates in the prostate. Prostate is enlarged. No bowel obstruction. The appendix is normal.  Fluid-filled large bowel. Peritoneum and retroperitoneum WNL. Vessels WNL. Abdominal wall WNL. Unchanged heterogeneous lesion in the right humerus.    CT chest WO 11/25: Patent central airways. No pulmonary nodules. No pleural effusion. Normal vessels. Normal heart size. No pericardial effusion. No mediastinal or hilar adenopathy. Normal chest wall.    CT cervical spine WO 11/25: Anterior ossification suggesting DISH or ankylosing spondylitis. Degenerative changes. No acute fractures or dislocations.    CT head WO 11/25: Left frontal peripheral high density lesion consistent with metastatic melanoma, cannot exclude a small amount of hemorrhage. Vasogenic edema with mild mass effect on the left lateral ventricle.    CXR 11/25: No acute cardiopulmonary disease process. Cardiomegaly.    Cardiac Testing:  Tele 11/30: Personally reviewed. Afib. Rate 100s.     TTE 11/26: Left ventricular systolic function is hyperdynamic with an ejection fraction of 75 % by Langford's method of disks. Analysis of left ventricular diastolic function and filling pressure is made challenging by the presence of tachycardia. Normal right ventricular cavity size. The right atrium is normal in size. Structurally normal mitral valve with normal leaflet excursion. Structurally normal tricuspid valve with normal leaflet excursion. Interatrial septum is aneurysmal. Mild to moderate aortic regurgitation. The peak transaortic velocity is 1.80 m/s, peak transaortic gradient is 13.0 mmHg and mean transaortic gradient is 7.0 mmHg with an LVOT/aortic valve VTI ratio of 0.85. The effective orifice area is estimated at 3.55 cm² by the continuity equation. Trileaflet aortic valve with normal systolic excursion.    EKG 11/25: Rate 121. Afib RVR.    Meds:  MEDICATIONS  (STANDING):  chlorhexidine 2% Cloths 1 Application(s) Topical <User Schedule>  methylPREDNISolone sodium succinate Injectable 125 milliGRAM(s) IV Push daily  metoprolol tartrate 25 milliGRAM(s) Oral every 8 hours  pantoprazole    Tablet 40 milliGRAM(s) Oral before breakfast  rosuvastatin 10 milliGRAM(s) Oral at bedtime    MEDICATIONS  (PRN):  acetaminophen     Tablet .. 650 milliGRAM(s) Oral every 6 hours PRN Temp greater or equal to 38C (100.4F)
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
## 2024-11-30 NOTE — PROGRESS NOTE ADULT - ASSESSMENT
75 [place] [facility] [place]o [place] on immunotherapy, aortic aneurysm, chronic lower back pain, HLD, HTN, OA,  presents to the ED c/o diarrhea x1 month.    # metastatic melanoma- mets to brain and adrenal gland   - [place] [facility]   - diagnosed via adrenal biopsy 10/15/24   - pt given dose of nivo/ipilimumab on 11/8/24   - 11/5/24 CT c/a/p uncahnged confluent mesenteric adenopathy up to 4 cm near midline with small bowelmural thickening noted   - wbc 24, Hb 9.7, plt 172 - leukocytosis 2/2 steroids     # small ICH- known brain mets   - [place] [facility] [place] c/w metastatic disease some of which have hemorrhagic features including the dominant left middle frontal lesion. LEft superficial occipital lesion is probably cortical, possibly leptomeningeal   - CTH ordered for AMS and Left frontal small cortical - non surgical ICH.  - seen by neurosurgery - MRI brain 11/28 with b/l enhancing high T1 signal lesions as described compatible with hyperdesne and/or hemorrhagic mets, no acute infarct   - to f/u outpt with [facility] for RT     # diarrhea   - diarrhea likely 2/2 reaction with medications  - he was started on prednisone 80 mg qd on 11/12/24 with rapid improvement and was tapered down to 60 mg now with worsening diarrhea  - infectious stool studies were negative on 11/13/24 and fecal calprotetin was 349 - stool culture in hospital negative   - c/w methylprednisolone 1 mg/kg qd - when ready for dc can change to equivalent of po prednisone   - s/p infliximab: 5 mg/kg (660 mg) 11/27- a second dose may be repeated 2 weeks later, and a third dose may be considered at 6 weeks if needed; use in combination with a corticosteroid  - replete lytes  - stool output has decreased    # hypotension- improved, now off pressors  # afib- cardio following - HR improved     will follow daily and [place] [facility]

## 2024-12-01 LAB
ANION GAP SERPL CALC-SCNC: 0 MMOL/L — LOW (ref 5–17)
BUN SERPL-MCNC: 36 MG/DL — HIGH (ref 7–23)
CALCIUM SERPL-MCNC: 8.4 MG/DL — LOW (ref 8.5–10.1)
CHLORIDE SERPL-SCNC: 110 MMOL/L — HIGH (ref 96–108)
CO2 SERPL-SCNC: 26 MMOL/L — SIGNIFICANT CHANGE UP (ref 22–31)
CREAT SERPL-MCNC: 1.04 MG/DL — SIGNIFICANT CHANGE UP (ref 0.5–1.3)
CULTURE RESULTS: SIGNIFICANT CHANGE UP
CULTURE RESULTS: SIGNIFICANT CHANGE UP
EGFR: 75 ML/MIN/1.73M2 — SIGNIFICANT CHANGE UP
GLUCOSE SERPL-MCNC: 81 MG/DL — SIGNIFICANT CHANGE UP (ref 70–99)
MAGNESIUM SERPL-MCNC: 2.1 MG/DL — SIGNIFICANT CHANGE UP (ref 1.6–2.6)
PHOSPHATE SERPL-MCNC: 3.1 MG/DL — SIGNIFICANT CHANGE UP (ref 2.5–4.5)
POTASSIUM SERPL-MCNC: 4.6 MMOL/L — SIGNIFICANT CHANGE UP (ref 3.5–5.3)
POTASSIUM SERPL-SCNC: 4.6 MMOL/L — SIGNIFICANT CHANGE UP (ref 3.5–5.3)
SODIUM SERPL-SCNC: 136 MMOL/L — SIGNIFICANT CHANGE UP (ref 135–145)
SPECIMEN SOURCE: SIGNIFICANT CHANGE UP
SPECIMEN SOURCE: SIGNIFICANT CHANGE UP

## 2024-12-01 PROCEDURE — 99233 SBSQ HOSP IP/OBS HIGH 50: CPT

## 2024-12-01 RX ADMIN — Medication 220 MILLIGRAM(S): at 09:02

## 2024-12-01 RX ADMIN — Medication 1 TABLET(S): at 09:02

## 2024-12-01 RX ADMIN — Medication 100 MILLIGRAM(S): at 09:02

## 2024-12-01 RX ADMIN — METOPROLOL TARTRATE 25 MILLIGRAM(S): 100 TABLET, FILM COATED ORAL at 05:26

## 2024-12-01 RX ADMIN — Medication 125 MILLIGRAM(S): at 05:26

## 2024-12-01 RX ADMIN — ROSUVASTATIN CALCIUM 10 MILLIGRAM(S): 5 TABLET, FILM COATED ORAL at 22:05

## 2024-12-01 RX ADMIN — PANTOPRAZOLE SODIUM 40 MILLIGRAM(S): 40 TABLET, DELAYED RELEASE ORAL at 05:26

## 2024-12-01 RX ADMIN — CHLORHEXIDINE GLUCONATE 1 APPLICATION(S): 1.2 RINSE ORAL at 05:43

## 2024-12-01 RX ADMIN — METOPROLOL TARTRATE 25 MILLIGRAM(S): 100 TABLET, FILM COATED ORAL at 13:12

## 2024-12-01 RX ADMIN — METOPROLOL TARTRATE 25 MILLIGRAM(S): 100 TABLET, FILM COATED ORAL at 22:05
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
## 2024-12-01 NOTE — PROGRESS NOTE ADULT - ASSESSMENT
75 y/o M who follows [facility] with h/o metastatic melanoma on immunotherapy, aortic aneurysm, chronic lower back pain, HLD, HTN, OA,  presents to the ED c/o diarrhea x1 month.    # metastatic melanoma- mets to brain and adrenal gland   - diagnosed via adrenal biopsy 10/15/24   - pt given dose of nivo/ipilimumab on 11/8/24   - 11/5/24 CT c/a/p uncahnged confluent mesenteric adenopathy up to 4 cm near midline with small bowelmural thickening noted   - will f/u with [facility] on dc     # small ICH- known brain mets   - [place]24 [place] [facility] [place] brain lesions c/w metastatic disease some of which have hemorrhagic features including the dominant left middle frontal lesion. LEft superficial occipital lesion is probably cortical, possibly leptomeningeal   - CTH ordered for AMS and Left frontal small cortical - non surgical ICH.  - seen by neurosurgery - MRI brain 11/28 with b/l enhancing high T1 signal lesions as described compatible with hyperdesne and/or hemorrhagic mets, no acute infarct   - to f/u outpt with [facility] for RT   - c/w prednisone 100 mg qd outpatient     # diarrhea   - diarrhea likely 2/2 reaction with medications  - he was started on prednisone 80 mg qd on 11/12/24 with rapid improvement and was tapered down to 60 mg now with worsening diarrhea  - infectious stool studies were negative on 11/13/24 and fecal calprotetin was 349 - stool culture in hospital negative   - when ready for dc can change to equivalent of po prednisone 100 mg qd   - s/p infliximab: 5 mg/kg (660 mg) 11/27- a second dose may be repeated 2 weeks later, and a third dose may be considered at 6 weeks if needed; use in combination with a corticosteroid  - replete lytes  - stool output has decreased    # hypotension- improved, now off pressors  # afib- cardio following - HR improved     will follow daily and communicate with [facility]   dc

## 2024-12-01 NOTE — PATIENT PROFILE ADULT - FALL HARM RISK - UNIVERSAL INTERVENTIONS
Bed in lowest position, wheels locked, appropriate side rails in place/Call bell, personal items and telephone in reach/Instruct patient to call for assistance before getting out of bed or chair/Non-slip footwear when patient is out of bed/[place]er to call system/Physically safe environment - no spills, clutter or unnecessary equipment/Purposeful Proactive Rounding/Room/bathroom lighting operational, light cord in reach

## 2024-12-01 NOTE — PROGRESS NOTE ADULT - NUTRITIONAL ASSESSMENT
This patient has been assessed with a concern for Malnutrition and has been determined to have a diagnosis/diagnoses of Severe protein-calorie malnutrition and Morbid obesity (BMI > 40).    This patient is being managed with:   Diet Regular-  Entered: Nov 26 2024  8:20AM  

## 2024-12-01 NOTE — PROGRESS NOTE ADULT - ASSESSMENT
75 yo man with HTN, HLD, aortic aneurysm, osteoarthritis, chronic lower back pain, metastatic melanoma on biologics, followed by [facility] [place], presented for further evaluation of worsening protracted diarrhea, worsening generalized weakness, trouble ambulating due to the weakness so he called EMS. Patient was found to have hypotension, tachycardia, leukocytosis, NEEL, hyponatremia, hypokalemia, elevated lactate, elevated troponin. CT head, C spine, chest, abd, pelvis was performed. CT head demonstrated a L frontal peripheral high density lesion consistent with metastatic melanoma, could not exclude a small amount of hemorrhage. Vasogenic edema with mild mass effect on the left lateral ventricle was also seen. CT c spine showed some anterior ossification. No acute findings. CT chest was unrevealing. CT abdomen, pelvis also was fairly unrevealing. A Martinez that had been inserted in the ER was malpositioned and the large bowel was fluid-filled without obstruction. Patient remained hypotensive and tachycardic despite fluid resuscitation so he was admitted to the ICU for further care. He was ultimately suspected to have immunotherapy induced colitis, received IV steroids and on 11/27 received infliximab. Diarrhea is now improved. Volume status is much improved. NEEL and electrolyte derangements have resolved. He has been transferred to Medicine.     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 Recently [place] [facility] [place] showed immunotherapy-induced colitis, was supposed to start Entyvio outpatient. CT A/P unrevealing. RVP negative. GI PCR negative. Stool culture negative. Monitoring off antibiotics as per ID. Consulted GI. Started patient on IV steroids. Patient received infliximab x 1 on 11/27. Diarrhea now improving. Stool just starting to become semi formed. Markedly less frequent as well, down to 3 BMs since yesterday evening. Anticipate that he may need a second dose of infliximab in 2 weeks, a third dose may be considered at 6 weeks if needed; use in combination with a corticosteroid.     Hypovolemic shock  In setting of severe protracted diarrhea. Did not initially respond to fluid resuscitation. Required admission to ICU, vasopressor support. Volume status improved. BP stable now, off pressors.     New onset afib RVR  Appreciate input from Cardiology. Remains in afib, rates 90s-100s. 2D echo with normal LVEF. Continue beta blocker. No AC for now given cannot hemorrhagic metastatic disease in brain. Thus, no DCCV for now as patient cannot have AC. Can consider Watchman at a later date.     Elevated troponin  Likely myocardial demand ischemia in setting of SIRS, diarrhea, hypovolemia, hypotension. 2D echo with normal LVEF. Continue beta blocker, statin. Not on antiplatelet presumably because of the possible hemorrhagic brain metastasis.     Elevated lactate  Due to diarrhea, hypovolemia, hypotension. Clinically improved.    NEEL  Likely pre-renal that progressed to ATN. With treatment of the underlying issues, Cr has since normalized.     Hyponatremia  Likely hypovolemic hyponatremia. Patient has since been fluid resuscitated and diarrhea has improved. Na has normalized.     Hypokalemia  Due to protracted diarrhea. Now improved. K now WNL.     Metastatic melanoma  Appreciate Oncology input. Patient follows with [facility]. He has mets to brain and adrenal gland, diagnosed via adrenal biopsy 10/15/24. Patient given dose of nivo/ipilimumab on 11/8/24. Outpatient follow up with [facility] upon discharge.      Small ICH from known brain mets   [place] [facility] [place] c/w metastatic disease some of which have hemorrhagic features including the dominant left middle frontal lesion. Left superficial occipital lesion is probably cortical, possibly leptomeningeal. MRI brain 11/28 with B/L enhancing high T1 signal lesions as described compatible with hyperdesne and/or hemorrhagic mets, no acute infarct. No neurosurgical intervention as per Neurosurgery.      Severe protein calorie malnutrition  Appreciate input from Nutrition. Liberalized diet to regular. Supplement with Premier Protein Shake BID. MVI with minerals daily. Thiamine daily. Zinc 220 daily.

## 2024-12-01 NOTE — PATIENT PROFILE ADULT - VISION (WITH CORRECTIVE LENSES IF THE PATIENT USUALLY WEARS THEM):
Normal vision: sees adequately in most situations; can see medication labels, newsprint glasses present/Partially impaired: cannot see medication labels or newsprint, but can see obstacles in path, and the surrounding layout; can count fingers at arm's length

## 2024-12-02 ENCOUNTER — TRANSCRIPTION ENCOUNTER (OUTPATIENT)
Age: 74
End: 2024-12-02

## 2024-12-02 VITALS
SYSTOLIC BLOOD PRESSURE: 113 MMHG | RESPIRATION RATE: 19 BRPM | DIASTOLIC BLOOD PRESSURE: 47 MMHG | OXYGEN SATURATION: 100 % | HEART RATE: 92 BPM | TEMPERATURE: 98 F

## 2024-12-02 LAB
ANION GAP SERPL CALC-SCNC: 3 MMOL/L — LOW (ref 5–17)
BUN SERPL-MCNC: 33 MG/DL — HIGH (ref 7–23)
CALCIUM SERPL-MCNC: 8.1 MG/DL — LOW (ref 8.5–10.1)
CHLORIDE SERPL-SCNC: 111 MMOL/L — HIGH (ref 96–108)
CO2 SERPL-SCNC: 24 MMOL/L — SIGNIFICANT CHANGE UP (ref 22–31)
CREAT SERPL-MCNC: 0.89 MG/DL — SIGNIFICANT CHANGE UP (ref 0.5–1.3)
EGFR: 89 ML/MIN/1.73M2 — SIGNIFICANT CHANGE UP
GLUCOSE SERPL-MCNC: 85 MG/DL — SIGNIFICANT CHANGE UP (ref 70–99)
HCT VFR BLD CALC: 29.3 % — LOW (ref 39–50)
HGB BLD-MCNC: 9.7 G/DL — LOW (ref 13–17)
MCHC RBC-ENTMCNC: 27.7 PG — SIGNIFICANT CHANGE UP (ref 27–34)
MCHC RBC-ENTMCNC: 33.1 G/DL — SIGNIFICANT CHANGE UP (ref 32–36)
MCV RBC AUTO: 83.7 FL — SIGNIFICANT CHANGE UP (ref 80–100)
PLATELET # BLD AUTO: 163 K/UL — SIGNIFICANT CHANGE UP (ref 150–400)
POTASSIUM SERPL-MCNC: 4.5 MMOL/L — SIGNIFICANT CHANGE UP (ref 3.5–5.3)
POTASSIUM SERPL-SCNC: 4.5 MMOL/L — SIGNIFICANT CHANGE UP (ref 3.5–5.3)
RBC # BLD: 3.5 M/UL — LOW (ref 4.2–5.8)
RBC # FLD: 13.9 % — SIGNIFICANT CHANGE UP (ref 10.3–14.5)
SODIUM SERPL-SCNC: 138 MMOL/L — SIGNIFICANT CHANGE UP (ref 135–145)
WBC # BLD: 25.06 K/UL — HIGH (ref 3.8–10.5)
WBC # FLD AUTO: 25.06 K/UL — HIGH (ref 3.8–10.5)

## 2024-12-02 PROCEDURE — 99239 HOSP IP/OBS DSCHRG MGMT >30: CPT

## 2024-12-02 RX ORDER — METOPROLOL TARTRATE 100 MG/1
50 TABLET, FILM COATED ORAL
Refills: 0 | Status: DISCONTINUED | OUTPATIENT
Start: 2024-12-02 | End: 2024-12-02

## 2024-12-02 RX ORDER — APIXABAN 2.5 MG/1
1 TABLET, FILM COATED ORAL
Qty: 60 | Refills: 0
Start: 2024-12-02 | End: 2024-12-31

## 2024-12-02 RX ORDER — METOPROLOL TARTRATE 100 MG/1
1 TABLET, FILM COATED ORAL
Qty: 0 | Refills: 0 | DISCHARGE
Start: 2024-12-02

## 2024-12-02 RX ORDER — PREDNISONE 20 MG/1
2 TABLET ORAL
Qty: 14 | Refills: 0
Start: 2024-12-02 | End: 2024-12-08

## 2024-12-02 RX ADMIN — METOPROLOL TARTRATE 25 MILLIGRAM(S): 100 TABLET, FILM COATED ORAL at 06:43

## 2024-12-02 RX ADMIN — Medication 100 MILLIGRAM(S): at 10:05

## 2024-12-02 RX ADMIN — Medication 220 MILLIGRAM(S): at 10:05

## 2024-12-02 RX ADMIN — Medication 125 MILLIGRAM(S): at 06:40

## 2024-12-02 RX ADMIN — PANTOPRAZOLE SODIUM 40 MILLIGRAM(S): 40 TABLET, DELAYED RELEASE ORAL at 06:42

## 2024-12-02 RX ADMIN — Medication 1 TABLET(S): at 10:05

## 2024-12-02 RX ADMIN — CHLORHEXIDINE GLUCONATE 1 APPLICATION(S): 1.2 RINSE ORAL at 06:46

## 2024-12-02 RX ADMIN — METOPROLOL TARTRATE 50 MILLIGRAM(S): 100 TABLET, FILM COATED ORAL at 10:07

## 2024-12-02 NOTE — DISCHARGE NOTE NURSING/CASE MANAGEMENT/SOCIAL WORK - NSDCPEFALRISK_GEN_ALL_CORE
For information on Fall & Injury Prevention, visit: [email or web address] OR  [email or web address] OR  https://www.cdc.gov/steadi/patient.html

## 2024-12-02 NOTE — DISCHARGE NOTE PROVIDER - PROVIDER TOKENS
PROVIDER:[TOKEN:[[id number]],FOLLOWUP:[1 week],ESTABLISHEDPATIENT:[T]],PROVIDER:[TOKEN:[[id number]],FOLLOWUP:[1 week],ESTABLISHEDPATIENT:[T]]
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
## 2024-12-02 NOTE — DISCHARGE NOTE PROVIDER - NSDCMRMEDTOKEN_GEN_ALL_CORE_FT
Eliquis 5 mg oral tablet: 1 tab(s) orally every 12 hours  Lopressor 50 mg oral tablet: 1 tab(s) orally 2 times a day  Multiple Vitamins oral capsule: 1 cap(s) orally once a day  predniSONE 50 mg oral tablet: 2 tab(s) orally once a day  rosuvastatin 10 mg oral tablet: 1 tab(s) orally once a day (at bedtime)  Vitamin C 500 mg oral tablet: 1 tab(s) orally once a day

## 2024-12-02 NOTE — PROGRESS NOTE ADULT - PROVIDER SPECIALTY LIST ADULT
Cardiology
Heme/Onc
Nephrology
Cardiology
Critical Care
Heme/Onc
Hospitalist
Critical Care
Heme/Onc
Heme/Onc
Hospitalist
Critical Care
Heme/Onc
Heme/Onc
Infectious Disease
Hospitalist
Neurosurgery
Cardiology
Critical Care
Cardiology
Critical Care

## 2024-12-02 NOTE — DISCHARGE NOTE PROVIDER - NSDCCPCAREPLAN_GEN_ALL_CORE_FT
PRINCIPAL DISCHARGE DIAGNOSIS  Diagnosis: Colitis  Assessment and Plan of Treatment: Please continue to take Prednisone 100mg PO daily. Please follow up with your [facility] team for further Prednisone management this week. Please DO NOT abruptly stop Predisone without discussing with your physician      SECONDARY DISCHARGE DIAGNOSES  Diagnosis: Hypotension  Assessment and Plan of Treatment: Your BP medications were optimized during this hospital course. Please follow up with your PCP soon after discharge for further management    Diagnosis: Syncope  Assessment and Plan of Treatment:     Diagnosis: Melanoma metastatic to brain  Assessment and Plan of Treatment: Please follow up with your [facility] Physicians soon after discharge for further management    Diagnosis: Acute renal failure (ARF)  Assessment and Plan of Treatment: resolved    Diagnosis: Hypokalemia  Assessment and Plan of Treatment:     Diagnosis: Acute UTI  Assessment and Plan of Treatment:     Diagnosis: Atrial fibrillation  Assessment and Plan of Treatment: You were started on Eliquis which is blood thinner to reduce your risk for stroke from Atrial Fibrillation. Please continue to take Metoprolol Tartrate 50mg every 12 hours daily. Please follow up with your Cardiologist soon after discharge
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
## 2024-12-02 NOTE — PROGRESS NOTE ADULT - ASSESSMENT
75 y/o M who follows at [facility] with h/o metastatic melanoma on immunotherapy, aortic aneurysm, chronic lower back pain, HLD, HTN, OA,  presents to the ED c/o diarrhea x1 month.    # metastatic melanoma- mets to brain and adrenal gland   - diagnosed via adrenal biopsy 10/15/24   - pt given dose of nivo/ipilimumab on 11/8/24   - 11/5/24 CT c/a/p uncahnged confluent mesenteric adenopathy up to 4 cm near midline with small bowelmural thickening noted   - will f/u with [facility] on dc to discuss alternative treatments     # small ICH- known brain mets   - last MRI brain 11/5/24 at [facility] with multiple brain lesions c/w metastatic disease some of which have hemorrhagic features including the dominant left middle frontal lesion. LEft superficial occipital lesion is probably cortical, possibly leptomeningeal   - CTH ordered for AMS and Left frontal small cortical - non surgical ICH.  - seen by neurosurgery - MRI brain 11/28 with b/l enhancing high T1 signal lesions as described compatible with hyperdesne and/or hemorrhagic mets, no acute infarct   - to f/u outpt with [facility] for RT   - c/w prednisone 100 mg qd outpatient w/ taper as per [facility]     # diarrhea   - diarrhea likely 2/2 reaction with medications  - he was started on prednisone 80 mg qd on 11/12/24 with rapid improvement and was tapered down to 60 mg now with worsening diarrhea  - infectious stool studies were negative on 11/13/24 and fecal calprotetin was 349 - stool culture in hospital negative   - when ready for dc can change to equivalent of po prednisone 100 mg qd   - s/p infliximab: 5 mg/kg (660 mg) 11/27- a second dose may be repeated 2 weeks later, and a third dose may be considered at 6 weeks if needed; use in combination with a corticosteroid  - replete lytes  - stool output has decreased    # hypotension- improved, now off pressors  # afib- cardio following - HR improved    will follow daily and communicate with [facility]   dc

## 2024-12-02 NOTE — DISCHARGE NOTE NURSING/CASE MANAGEMENT/SOCIAL WORK - PATIENT PORTAL LINK FT
You can access the FollowMyHealth Patient Portal offered by [facility] by registering at the following website: [email or web address]. By joining [employer]’s FollowMyHealth portal, you will also be able to view your health information using other applications (apps) compatible with our system.

## 2024-12-02 NOTE — DISCHARGE NOTE PROVIDER - DETAILS OF MALNUTRITION DIAGNOSIS/DIAGNOSES
This patient has been assessed with a concern for Malnutrition and was treated during this hospitalization for the following Nutrition diagnosis/diagnoses:     -  11/26/2024: Severe protein-calorie malnutrition   -  11/26/2024: Morbid obesity (BMI > 40)

## 2024-12-02 NOTE — DISCHARGE NOTE PROVIDER - HOSPITAL COURSE
75 y/o M with PMHx of aortic aneurysm, chronic lower back pain, HLD, HTN, OA, metastatic melanoma on Biologics presents to the ED c/o diarrhea x1 month, severe at times having more than 10 bms per today. Vomited yesterday, and became weak having 3 falls injuring his head. Today pt was so weak he couldn't get up, so he called EMS. EMS found him to be hypotensive, awake, and alert. Pt also noted that he passed out today. BIB EMS, hypotensive with rpaid heart beat. PT with no chest pain or SOB, no pain anywhere    Sub: Pt seen and evaluated. Feeling better. Diarrhea is improved. No other acute complaints     Vital Signs Last 24 Hrs  T(C): 36.4 (02 Dec 2024 11:00), Max: 36.6 (02 Dec 2024 00:00)  T(F): 97.5 (02 Dec 2024 11:00), Max: 97.9 (02 Dec 2024 00:00)  HR: 92 (02 Dec 2024 11:00) (72 - 98)  BP: 113/47 (02 Dec 2024 11:00) (113/47 - 130/62)  RR: 19 (02 Dec 2024 11:00) (18 - 19)  SpO2: 100% (02 Dec 2024 11:00) (97% - 100%)    Gen: Comfortable appearing  HEENT: NCAT PERRL EOMI MMM clear oropharynx  Neck: Supple, no JVD, no LAD, no C spine tenderness, no thyromegaly  CVS: s1 s2 normal, RRR  Chest: Normal resp effort, lungs CTA B/L  Abd: Non distended, +BS, soft, non tender  Ext: No extremity tenderness, intact peripheral pulses, normal capillary refill  Skin: Warm, dry  Mood: Humza, pleasant  Neuro: Awake and alert, follows commands, answers questions appropriately    Diarrhea, SIRS without sepsis, present upon admission  -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 Recently underwent sigmoidoscopy ~2 weeks ago at [facility] [place], path showed immunotherapy-induced colitis, was supposed to start Entyvio outpatient.   -CT A/P unrevealing. RVP negative. GI PCR negative.   -Stool culture negative. Monitoring off antibiotics as per ID  -Currently on Solu-Medrol, discharged on prednisone 100 mg p.o. daily  -Further titration of steroids per [facility]. Discussed with patient not to abruptly stop steroids   -Patient received infliximab x 1 on 11/27  -Anticipate that he may need a second dose of infliximab in 2 weeks, a third dose may be considered at 6 weeks if needed; use in combination with a corticosteroid.     Hypovolemic shock  In setting of severe protracted diarrhea. Did not initially respond to fluid resuscitation. Required admission to ICU, vasopressor support. Volume status improved. BP stable now, off pressors.     New onset afib RVR  Appreciate input from Cardiology. Remains in afib, rates 90s-100s. 2D echo with normal LVEF.  -Continue beta blocker: Metoprolol titrate 50 mg twice daily  -Start Eliquis 5 mg twice daily  -Discussed with neurosurgery: No contraindication to starting DOAC per neurosurgery  -Also discussed with cardiology  -Risk and benefits discussed with the patient, agreeable to starting DOAC    #Elevated troponin  Likely myocardial demand ischemia in setting of SIRS, diarrhea, hypovolemia, hypotension. 2D echo with normal LVEF. Continue beta blocker, statin.   Elevated lactate  Due to diarrhea, hypovolemia, hypotension. Clinically improved.    NEEL  Likely pre-renal that progressed to ATN. With treatment of the underlying issues, Cr has since normalized.     Hyponatremia  Likely hypovolemic hyponatremia. Patient has since been fluid resuscitated and diarrhea has improved. Na has normalized.     Hypokalemia  Due to protracted diarrhea. Now improved. K now WNL.     Metastatic melanoma  Appreciate Oncology input. Patient follows with [facility] [place]. He has mets to brain and adrenal gland, diagnosed via adrenal biopsy 10/15/24. Patient given dose of nivo/ipilimumab on 11/8/24. Outpatient follow up with [facility] [place] upon discharge.      Small ICH from known brain mets   Last MRI brain 11/5/24 at [facility] [place] with multiple brain lesions c/w metastatic disease some of which have hemorrhagic features including the dominant left middle frontal lesion. Left superficial occipital lesion is probably cortical, possibly leptomeningeal. MRI brain 11/28 with B/L enhancing high T1 signal lesions as described compatible with hyperdesne and/or hemorrhagic mets, no acute infarct. No neurosurgical intervention as per Neurosurgery.      Severe protein calorie malnutrition  Appreciate input from Nutrition. Liberalized diet to regular. Supplement with Premier Protein Shake BID. MVI with minerals daily. Thiamine daily. Zinc 220 daily.

## 2024-12-02 NOTE — PROGRESS NOTE ADULT - REASON FOR ADMISSION
Diarrhea  SIRS  Hypovolemic shock  Afib RVR  Elevated troponin  Elevated lactate  NEEL  Hyponatremia  Hypokalemia
Shock
Hypovolemic Shock
Shock

## 2024-12-02 NOTE — DISCHARGE NOTE PROVIDER - CARE PROVIDER_API CALL
Sobia De La Torre  Internal Medicine  [address], [address]  [place] [address]  Phone: [phone]  Fax: [phone]  Established Patient  Follow Up Time: 1 week    Heide Eaton  NP in Adult Gerontology Acute Care  [address] [place] [address]  Phone: [phone]  Fax: [phone]  Established Patient  Follow Up Time: 1 week

## 2024-12-02 NOTE — DISCHARGE NOTE NURSING/CASE MANAGEMENT/SOCIAL WORK - FINANCIAL ASSISTANCE
[facility] provides services at a reduced cost to those who are determined to be eligible through [facility]’s financial assistance program. Information regarding [facility]’s financial assistance program can be found by going to [email or web address] or by calling [phone].

## 2024-12-02 NOTE — PROGRESS NOTE ADULT - ASSESSMENT
75 y/o M with PMHx of aortic aneurysm, chronic lower back pain, HLD, HTN, OA, metastatic melanoma on Biologics presents to the ED c/o diarrhea x1 month, severe at times having more than 10 bms per today. Vomited yesterday, and became weak having 3 falls injuring his head. Today pt was so weak he couldn't get up, so he called EMS. EMS found him to be hypotensive, awake, and alert. Pt also noted that he passed out today. BIB EMS, hypotensive with rapid heart beat. PT with no chest pain or SOB, no pain anywhere.    Shock, r/o septic shock  Hypovolemia. NEEL/ATN. N/V/D  New onset AF  Elevated Trop, suspect demand ischemia  Brain Lesion cannot r/o hemorrhage  Hyperlipidemia  Aortic Aneurysm (Last measurement 5.4cm in size)  Electrolyte Abnormalities    Tx to 3E. Remains in rate controlled afib on tele.   No AC for now given cannot hemorrhagic metastic disease in brain. Need to confirm with NSGY if not a candidate for AC  Can consider WATCHMAN at a later date.  2Decho with normal LV fxn  Cont Bbl for HR control   No CV at this time as pt cannot have a/c.

## 2024-12-06 DIAGNOSIS — C49.9 MALIGNANT NEOPLASM OF CONNECTIVE AND SOFT TISSUE, UNSPECIFIED: ICD-10-CM

## 2024-12-06 DIAGNOSIS — N17.0 ACUTE KIDNEY FAILURE WITH TUBULAR NECROSIS: ICD-10-CM

## 2024-12-06 DIAGNOSIS — I48.91 UNSPECIFIED ATRIAL FIBRILLATION: ICD-10-CM

## 2024-12-06 DIAGNOSIS — Z91.018 ALLERGY TO OTHER FOODS: ICD-10-CM

## 2024-12-06 DIAGNOSIS — K52.1 TOXIC GASTROENTERITIS AND COLITIS: ICD-10-CM

## 2024-12-06 DIAGNOSIS — E83.39 OTHER DISORDERS OF PHOSPHORUS METABOLISM: ICD-10-CM

## 2024-12-06 DIAGNOSIS — N39.0 URINARY TRACT INFECTION, SITE NOT SPECIFIED: ICD-10-CM

## 2024-12-06 DIAGNOSIS — Z88.1 ALLERGY STATUS TO OTHER ANTIBIOTIC AGENTS: ICD-10-CM

## 2024-12-06 DIAGNOSIS — E43 UNSPECIFIED SEVERE PROTEIN-CALORIE MALNUTRITION: ICD-10-CM

## 2024-12-06 DIAGNOSIS — C79.31 SECONDARY MALIGNANT NEOPLASM OF BRAIN: ICD-10-CM

## 2024-12-06 DIAGNOSIS — Z79.82 LONG TERM (CURRENT) USE OF ASPIRIN: ICD-10-CM

## 2024-12-06 DIAGNOSIS — I24.89 OTHER FORMS OF ACUTE ISCHEMIC HEART DISEASE: ICD-10-CM

## 2024-12-06 DIAGNOSIS — E78.5 HYPERLIPIDEMIA, UNSPECIFIED: ICD-10-CM

## 2024-12-06 DIAGNOSIS — E66.01 MORBID (SEVERE) OBESITY DUE TO EXCESS CALORIES: ICD-10-CM

## 2024-12-06 DIAGNOSIS — I95.9 HYPOTENSION, UNSPECIFIED: ICD-10-CM

## 2024-12-06 DIAGNOSIS — E87.6 HYPOKALEMIA: ICD-10-CM

## 2024-12-06 DIAGNOSIS — E87.1 HYPO-OSMOLALITY AND HYPONATREMIA: ICD-10-CM

## 2024-12-06 DIAGNOSIS — I10 ESSENTIAL (PRIMARY) HYPERTENSION: ICD-10-CM

## 2024-12-06 DIAGNOSIS — T45.1X5A ADVERSE EFFECT OF ANTINEOPLASTIC AND IMMUNOSUPPRESSIVE DRUGS, INITIAL ENCOUNTER: ICD-10-CM

## 2024-12-06 DIAGNOSIS — E86.0 DEHYDRATION: ICD-10-CM

## 2024-12-06 DIAGNOSIS — E87.29 OTHER ACIDOSIS: ICD-10-CM

## 2024-12-06 DIAGNOSIS — R65.11 SYSTEMIC INFLAMMATORY RESPONSE SYNDROME (SIRS) OF NON-INFECTIOUS ORIGIN WITH ACUTE ORGAN DYSFUNCTION: ICD-10-CM

## 2024-12-06 DIAGNOSIS — R55 SYNCOPE AND COLLAPSE: ICD-10-CM

## 2024-12-06 DIAGNOSIS — R57.1 HYPOVOLEMIC SHOCK: ICD-10-CM

## 2024-12-06 DIAGNOSIS — C79.70 SECONDARY MALIGNANT NEOPLASM OF UNSPECIFIED ADRENAL GLAND: ICD-10-CM

## 2025-04-01 NOTE — ASU PATIENT PROFILE, ADULT - PATIENT'S GENDER IDENTITY
49 yo M with a PMH of DM, sinus tachycardia, obesity, works as employee at [employer] pw atraumatic left sided lower back pain x 10 days. Pain intermittently shoots down his LLE. Worse with certain movements, .i.e bending over to put on his socks/shoes. Reports pain started after he "felt like he pulled his muscle at the gym." Went to see PMD a few days ago and was prescribed Celecoxib and flexiril which he has been taking w/o relief. Had MRI scheduled for this morning at 715AM but states he was in too much pain to lay flat so came to ED instead. Denies extremity weakness/paresthesias, bladder/bowel dsfxn, saddle anesthesia. Is able to ambulate. Male

## 2025-04-08 ENCOUNTER — APPOINTMENT (OUTPATIENT)
Dept: CARDIOTHORACIC SURGERY | Facility: CLINIC | Age: 75
End: 2025-04-08
Payer: MEDICARE

## 2025-04-08 VITALS
WEIGHT: 315 LBS | BODY MASS INDEX: 37.19 KG/M2 | HEIGHT: 77 IN | DIASTOLIC BLOOD PRESSURE: 81 MMHG | HEART RATE: 117 BPM | RESPIRATION RATE: 16 BRPM | SYSTOLIC BLOOD PRESSURE: 130 MMHG | OXYGEN SATURATION: 97 %

## 2025-04-08 DIAGNOSIS — I71.20 THORACIC AORTIC ANEURYSM, WITHOUT RUPTURE, UNSPECIFIED: ICD-10-CM

## 2025-04-08 PROCEDURE — 99213 OFFICE O/P EST LOW 20 MIN: CPT

## 2025-04-08 RX ORDER — ROSUVASTATIN CALCIUM 10 MG/1
10 TABLET, FILM COATED ORAL
Refills: 0 | Status: ACTIVE | COMMUNITY

## 2025-04-08 RX ORDER — FUROSEMIDE 20 MG/1
20 TABLET ORAL
Refills: 0 | Status: ACTIVE | COMMUNITY

## 2025-04-08 RX ORDER — USTEKINUMAB 130 MG/26ML
130 SOLUTION INTRAVENOUS
Refills: 0 | Status: ACTIVE | COMMUNITY

## 2025-04-22 ENCOUNTER — OUTPATIENT (OUTPATIENT)
Dept: OUTPATIENT SERVICES | Facility: HOSPITAL | Age: 75
LOS: 1 days | End: 2025-04-22
Payer: MEDICARE

## 2025-04-22 ENCOUNTER — APPOINTMENT (OUTPATIENT)
Dept: CT IMAGING | Facility: CLINIC | Age: 75
End: 2025-04-22

## 2025-04-22 DIAGNOSIS — Z98.890 OTHER SPECIFIED POSTPROCEDURAL STATES: Chronic | ICD-10-CM

## 2025-04-22 DIAGNOSIS — Z90.89 ACQUIRED ABSENCE OF OTHER ORGANS: Chronic | ICD-10-CM

## 2025-04-22 DIAGNOSIS — I71.20 THORACIC AORTIC ANEURYSM, WITHOUT RUPTURE, UNSPECIFIED: ICD-10-CM

## 2025-04-22 PROCEDURE — 71275 CT ANGIOGRAPHY CHEST: CPT | Mod: 26

## 2025-04-22 PROCEDURE — 71275 CT ANGIOGRAPHY CHEST: CPT

## 2025-05-04 ENCOUNTER — INPATIENT (INPATIENT)
Facility: HOSPITAL | Age: 75
LOS: 5 days | Discharge: HOME HEALTH | DRG: 188 | End: 2025-05-10
Attending: FAMILY MEDICINE | Admitting: STUDENT IN AN ORGANIZED HEALTH CARE EDUCATION/TRAINING PROGRAM
Payer: MEDICARE

## 2025-05-04 VITALS
DIASTOLIC BLOOD PRESSURE: 100 MMHG | HEIGHT: 71 IN | OXYGEN SATURATION: 96 % | SYSTOLIC BLOOD PRESSURE: 168 MMHG | HEART RATE: 115 BPM | TEMPERATURE: 98 F | RESPIRATION RATE: 21 BRPM | WEIGHT: 179.9 LBS

## 2025-05-04 DIAGNOSIS — J90 PLEURAL EFFUSION, NOT ELSEWHERE CLASSIFIED: ICD-10-CM

## 2025-05-04 DIAGNOSIS — Z90.89 ACQUIRED ABSENCE OF OTHER ORGANS: Chronic | ICD-10-CM

## 2025-05-04 DIAGNOSIS — Z98.890 OTHER SPECIFIED POSTPROCEDURAL STATES: Chronic | ICD-10-CM

## 2025-05-04 LAB
ALBUMIN SERPL ELPH-MCNC: 3.3 G/DL — SIGNIFICANT CHANGE UP (ref 3.3–5)
ALP SERPL-CCNC: 50 U/L — SIGNIFICANT CHANGE UP (ref 40–120)
ALT FLD-CCNC: 18 U/L — SIGNIFICANT CHANGE UP (ref 12–78)
ANION GAP SERPL CALC-SCNC: 5 MMOL/L — SIGNIFICANT CHANGE UP (ref 5–17)
APTT BLD: 31 SEC — SIGNIFICANT CHANGE UP (ref 26.1–36.8)
AST SERPL-CCNC: 34 U/L — SIGNIFICANT CHANGE UP (ref 15–37)
BASOPHILS # BLD AUTO: 0.09 K/UL — SIGNIFICANT CHANGE UP (ref 0–0.2)
BASOPHILS NFR BLD AUTO: 1 % — SIGNIFICANT CHANGE UP (ref 0–2)
BILIRUB SERPL-MCNC: 0.8 MG/DL — SIGNIFICANT CHANGE UP (ref 0.2–1.2)
BLD GP AB SCN SERPL QL: SIGNIFICANT CHANGE UP
BUN SERPL-MCNC: 22 MG/DL — SIGNIFICANT CHANGE UP (ref 7–23)
CALCIUM SERPL-MCNC: 8.6 MG/DL — SIGNIFICANT CHANGE UP (ref 8.5–10.1)
CHLORIDE SERPL-SCNC: 103 MMOL/L — SIGNIFICANT CHANGE UP (ref 96–108)
CO2 SERPL-SCNC: 25 MMOL/L — SIGNIFICANT CHANGE UP (ref 22–31)
CREAT SERPL-MCNC: 1 MG/DL — SIGNIFICANT CHANGE UP (ref 0.5–1.3)
EGFR: 78 ML/MIN/1.73M2 — SIGNIFICANT CHANGE UP
EGFR: 78 ML/MIN/1.73M2 — SIGNIFICANT CHANGE UP
EOSINOPHIL # BLD AUTO: 0.2 K/UL — SIGNIFICANT CHANGE UP (ref 0–0.5)
EOSINOPHIL NFR BLD AUTO: 2.2 % — SIGNIFICANT CHANGE UP (ref 0–6)
FLUAV AG NPH QL: SIGNIFICANT CHANGE UP
FLUBV AG NPH QL: SIGNIFICANT CHANGE UP
GLUCOSE SERPL-MCNC: 111 MG/DL — HIGH (ref 70–99)
HCT VFR BLD CALC: 35.6 % — LOW (ref 39–50)
HGB BLD-MCNC: 10.9 G/DL — LOW (ref 13–17)
IMM GRANULOCYTES # BLD AUTO: 0.03 K/UL — SIGNIFICANT CHANGE UP (ref 0–0.07)
IMM GRANULOCYTES NFR BLD AUTO: 0.3 % — SIGNIFICANT CHANGE UP (ref 0–0.9)
INR BLD: 1.29 RATIO — HIGH (ref 0.85–1.16)
LYMPHOCYTES # BLD AUTO: 1.51 K/UL — SIGNIFICANT CHANGE UP (ref 1–3.3)
LYMPHOCYTES NFR BLD AUTO: 16.3 % — SIGNIFICANT CHANGE UP (ref 13–44)
MAGNESIUM SERPL-MCNC: 2 MG/DL — SIGNIFICANT CHANGE UP (ref 1.6–2.6)
MCHC RBC-ENTMCNC: 23.1 PG — LOW (ref 27–34)
MCHC RBC-ENTMCNC: 30.6 G/DL — LOW (ref 32–36)
MCV RBC AUTO: 75.4 FL — LOW (ref 80–100)
MONOCYTES # BLD AUTO: 0.98 K/UL — HIGH (ref 0–0.9)
MONOCYTES NFR BLD AUTO: 10.5 % — SIGNIFICANT CHANGE UP (ref 2–14)
NEUTROPHILS # BLD AUTO: 6.48 K/UL — SIGNIFICANT CHANGE UP (ref 1.8–7.4)
NEUTROPHILS NFR BLD AUTO: 69.7 % — SIGNIFICANT CHANGE UP (ref 43–77)
NRBC # BLD AUTO: 0 K/UL — SIGNIFICANT CHANGE UP (ref 0–0)
NRBC # FLD: 0 K/UL — SIGNIFICANT CHANGE UP (ref 0–0)
NRBC BLD AUTO-RTO: 0 /100 WBCS — SIGNIFICANT CHANGE UP (ref 0–0)
NT-PROBNP SERPL-SCNC: 9832 PG/ML — HIGH (ref 0–450)
PLATELET # BLD AUTO: 249 K/UL — SIGNIFICANT CHANGE UP (ref 150–400)
PMV BLD: 10.2 FL — SIGNIFICANT CHANGE UP (ref 7–13)
POTASSIUM SERPL-MCNC: 4.5 MMOL/L — SIGNIFICANT CHANGE UP (ref 3.5–5.3)
POTASSIUM SERPL-SCNC: 4.5 MMOL/L — SIGNIFICANT CHANGE UP (ref 3.5–5.3)
PROT SERPL-MCNC: 7.4 GM/DL — SIGNIFICANT CHANGE UP (ref 6–8.3)
PROTHROM AB SERPL-ACNC: 15.2 SEC — HIGH (ref 9.9–13.4)
RBC # BLD: 4.72 M/UL — SIGNIFICANT CHANGE UP (ref 4.2–5.8)
RBC # FLD: 16.5 % — HIGH (ref 10.3–14.5)
RSV RNA NPH QL NAA+NON-PROBE: SIGNIFICANT CHANGE UP
SARS-COV-2 RNA SPEC QL NAA+PROBE: SIGNIFICANT CHANGE UP
SODIUM SERPL-SCNC: 133 MMOL/L — LOW (ref 135–145)
SOURCE RESPIRATORY: SIGNIFICANT CHANGE UP
TROPONIN I, HIGH SENSITIVITY RESULT: 16.77 NG/L — SIGNIFICANT CHANGE UP
WBC # BLD: 9.29 K/UL — SIGNIFICANT CHANGE UP (ref 3.8–10.5)
WBC # FLD AUTO: 9.29 K/UL — SIGNIFICANT CHANGE UP (ref 3.8–10.5)

## 2025-05-04 PROCEDURE — C1894: CPT

## 2025-05-04 PROCEDURE — 87070 CULTURE OTHR SPECIMN AEROBIC: CPT

## 2025-05-04 PROCEDURE — 83880 ASSAY OF NATRIURETIC PEPTIDE: CPT

## 2025-05-04 PROCEDURE — 82042 OTHER SOURCE ALBUMIN QUAN EA: CPT

## 2025-05-04 PROCEDURE — 70553 MRI BRAIN STEM W/O & W/DYE: CPT

## 2025-05-04 PROCEDURE — 86900 BLOOD TYPING SEROLOGIC ABO: CPT

## 2025-05-04 PROCEDURE — 87102 FUNGUS ISOLATION CULTURE: CPT

## 2025-05-04 PROCEDURE — 71275 CT ANGIOGRAPHY CHEST: CPT | Mod: 26

## 2025-05-04 PROCEDURE — C1887: CPT

## 2025-05-04 PROCEDURE — 93306 TTE W/DOPPLER COMPLETE: CPT

## 2025-05-04 PROCEDURE — 99285 EMERGENCY DEPT VISIT HI MDM: CPT | Mod: FS

## 2025-05-04 PROCEDURE — C1893: CPT

## 2025-05-04 PROCEDURE — C1732: CPT

## 2025-05-04 PROCEDURE — 85025 COMPLETE CBC W/AUTO DIFF WBC: CPT

## 2025-05-04 PROCEDURE — 80162 ASSAY OF DIGOXIN TOTAL: CPT

## 2025-05-04 PROCEDURE — 97162 PT EVAL MOD COMPLEX 30 MIN: CPT | Mod: GP

## 2025-05-04 PROCEDURE — 83550 IRON BINDING TEST: CPT

## 2025-05-04 PROCEDURE — 85027 COMPLETE CBC AUTOMATED: CPT

## 2025-05-04 PROCEDURE — 82728 ASSAY OF FERRITIN: CPT

## 2025-05-04 PROCEDURE — 82533 TOTAL CORTISOL: CPT

## 2025-05-04 PROCEDURE — 85045 AUTOMATED RETICULOCYTE COUNT: CPT

## 2025-05-04 PROCEDURE — 74177 CT ABD & PELVIS W/CONTRAST: CPT | Mod: 26

## 2025-05-04 PROCEDURE — 93010 ELECTROCARDIOGRAM REPORT: CPT

## 2025-05-04 PROCEDURE — 84484 ASSAY OF TROPONIN QUANT: CPT

## 2025-05-04 PROCEDURE — C1786: CPT

## 2025-05-04 PROCEDURE — 86140 C-REACTIVE PROTEIN: CPT

## 2025-05-04 PROCEDURE — C1889: CPT

## 2025-05-04 PROCEDURE — 85610 PROTHROMBIN TIME: CPT

## 2025-05-04 PROCEDURE — A9579: CPT

## 2025-05-04 PROCEDURE — 80048 BASIC METABOLIC PNL TOTAL CA: CPT

## 2025-05-04 PROCEDURE — 84100 ASSAY OF PHOSPHORUS: CPT

## 2025-05-04 PROCEDURE — 84157 ASSAY OF PROTEIN OTHER: CPT

## 2025-05-04 PROCEDURE — 82945 GLUCOSE OTHER FLUID: CPT

## 2025-05-04 PROCEDURE — 82803 BLOOD GASES ANY COMBINATION: CPT

## 2025-05-04 PROCEDURE — 33207 INSERT HEART PM VENTRICULAR: CPT

## 2025-05-04 PROCEDURE — 83986 ASSAY PH BODY FLUID NOS: CPT

## 2025-05-04 PROCEDURE — 86901 BLOOD TYPING SEROLOGIC RH(D): CPT

## 2025-05-04 PROCEDURE — 88305 TISSUE EXAM BY PATHOLOGIST: CPT

## 2025-05-04 PROCEDURE — 82746 ASSAY OF FOLIC ACID SERUM: CPT

## 2025-05-04 PROCEDURE — 36415 COLL VENOUS BLD VENIPUNCTURE: CPT

## 2025-05-04 PROCEDURE — 83735 ASSAY OF MAGNESIUM: CPT

## 2025-05-04 PROCEDURE — 93970 EXTREMITY STUDY: CPT

## 2025-05-04 PROCEDURE — 97530 THERAPEUTIC ACTIVITIES: CPT | Mod: GP

## 2025-05-04 PROCEDURE — 84443 ASSAY THYROID STIM HORMONE: CPT

## 2025-05-04 PROCEDURE — 83540 ASSAY OF IRON: CPT

## 2025-05-04 PROCEDURE — 80061 LIPID PANEL: CPT

## 2025-05-04 PROCEDURE — 93650 ICAR CATH ABLTJ AV NODE FUNC: CPT

## 2025-05-04 PROCEDURE — 71045 X-RAY EXAM CHEST 1 VIEW: CPT | Mod: 26

## 2025-05-04 PROCEDURE — 82607 VITAMIN B-12: CPT

## 2025-05-04 PROCEDURE — 99223 1ST HOSP IP/OBS HIGH 75: CPT

## 2025-05-04 PROCEDURE — 94760 N-INVAS EAR/PLS OXIMETRY 1: CPT

## 2025-05-04 PROCEDURE — 87015 SPECIMEN INFECT AGNT CONCNTJ: CPT

## 2025-05-04 PROCEDURE — 80053 COMPREHEN METABOLIC PANEL: CPT

## 2025-05-04 PROCEDURE — 86850 RBC ANTIBODY SCREEN: CPT

## 2025-05-04 PROCEDURE — C1898: CPT

## 2025-05-04 PROCEDURE — 89051 BODY FLUID CELL COUNT: CPT

## 2025-05-04 PROCEDURE — 88108 CYTOPATH CONCENTRATE TECH: CPT

## 2025-05-04 PROCEDURE — C1769: CPT

## 2025-05-04 PROCEDURE — 93005 ELECTROCARDIOGRAM TRACING: CPT

## 2025-05-04 PROCEDURE — 80076 HEPATIC FUNCTION PANEL: CPT

## 2025-05-04 PROCEDURE — 87075 CULTR BACTERIA EXCEPT BLOOD: CPT

## 2025-05-04 PROCEDURE — 82306 VITAMIN D 25 HYDROXY: CPT

## 2025-05-04 PROCEDURE — 82024 ASSAY OF ACTH: CPT

## 2025-05-04 PROCEDURE — 97116 GAIT TRAINING THERAPY: CPT | Mod: GP

## 2025-05-04 PROCEDURE — 83615 LACTATE (LD) (LDH) ENZYME: CPT

## 2025-05-04 PROCEDURE — 84145 PROCALCITONIN (PCT): CPT

## 2025-05-04 PROCEDURE — 71045 X-RAY EXAM CHEST 1 VIEW: CPT

## 2025-05-04 RX ORDER — METOPROLOL SUCCINATE 50 MG/1
75 TABLET, EXTENDED RELEASE ORAL EVERY 12 HOURS
Refills: 0 | Status: DISCONTINUED | OUTPATIENT
Start: 2025-05-04 | End: 2025-05-07

## 2025-05-04 RX ORDER — IPILIMUMAB 5 MG/ML
1 INJECTION INTRAVENOUS
Refills: 0 | DISCHARGE

## 2025-05-04 RX ORDER — FUROSEMIDE 10 MG/ML
40 INJECTION INTRAMUSCULAR; INTRAVENOUS ONCE
Refills: 0 | Status: COMPLETED | OUTPATIENT
Start: 2025-05-04 | End: 2025-05-04

## 2025-05-04 RX ORDER — MIDAZOLAM IN 0.9 % SOD.CHLORID 1 MG/ML
1 PLASTIC BAG, INJECTION (ML) INTRAVENOUS ONCE
Refills: 0 | Status: DISCONTINUED | OUTPATIENT
Start: 2025-05-04 | End: 2025-05-04

## 2025-05-04 RX ORDER — METOPROLOL SUCCINATE 50 MG/1
75 TABLET, EXTENDED RELEASE ORAL EVERY 12 HOURS
Refills: 0 | Status: DISCONTINUED | OUTPATIENT
Start: 2025-05-04 | End: 2025-05-04

## 2025-05-04 RX ORDER — ACETAMINOPHEN 500 MG/5ML
650 LIQUID (ML) ORAL EVERY 6 HOURS
Refills: 0 | Status: DISCONTINUED | OUTPATIENT
Start: 2025-05-04 | End: 2025-05-10

## 2025-05-04 RX ORDER — ROSUVASTATIN CALCIUM 20 MG/1
10 TABLET, FILM COATED ORAL AT BEDTIME
Refills: 0 | Status: DISCONTINUED | OUTPATIENT
Start: 2025-05-04 | End: 2025-05-10

## 2025-05-04 RX ORDER — ONDANSETRON HCL/PF 4 MG/2 ML
4 VIAL (ML) INJECTION EVERY 4 HOURS
Refills: 0 | Status: DISCONTINUED | OUTPATIENT
Start: 2025-05-04 | End: 2025-05-10

## 2025-05-04 RX ORDER — USTEKINUMAB-AUUB 45 MG/.5ML
1 INJECTION, SOLUTION SUBCUTANEOUS
Refills: 0 | DISCHARGE

## 2025-05-04 RX ORDER — FUROSEMIDE 10 MG/ML
40 INJECTION INTRAMUSCULAR; INTRAVENOUS DAILY
Refills: 0 | Status: DISCONTINUED | OUTPATIENT
Start: 2025-05-05 | End: 2025-05-07

## 2025-05-04 RX ADMIN — Medication 1 MILLIGRAM(S): at 20:00

## 2025-05-04 RX ADMIN — FUROSEMIDE 40 MILLIGRAM(S): 10 INJECTION INTRAMUSCULAR; INTRAVENOUS at 20:31

## 2025-05-05 ENCOUNTER — RESULT REVIEW (OUTPATIENT)
Age: 75
End: 2025-05-05

## 2025-05-05 LAB
ADD ON TEST-SPECIMEN IN LAB: SIGNIFICANT CHANGE UP
ADD ON TEST-SPECIMEN IN LAB: SIGNIFICANT CHANGE UP
ALBUMIN FLD-MCNC: 1.9 G/DL — SIGNIFICANT CHANGE UP
ALBUMIN SERPL ELPH-MCNC: 3.5 G/DL — SIGNIFICANT CHANGE UP (ref 3.3–5)
ALP SERPL-CCNC: 57 U/L — SIGNIFICANT CHANGE UP (ref 40–120)
ALT FLD-CCNC: 17 U/L — SIGNIFICANT CHANGE UP (ref 12–78)
ANION GAP SERPL CALC-SCNC: 7 MMOL/L — SIGNIFICANT CHANGE UP (ref 5–17)
AST SERPL-CCNC: 20 U/L — SIGNIFICANT CHANGE UP (ref 15–37)
B PERT IGG+IGM PNL SER: CLEAR — SIGNIFICANT CHANGE UP
BILIRUB SERPL-MCNC: 0.9 MG/DL — SIGNIFICANT CHANGE UP (ref 0.2–1.2)
BUN SERPL-MCNC: 21 MG/DL — SIGNIFICANT CHANGE UP (ref 7–23)
CALCIUM SERPL-MCNC: 9.4 MG/DL — SIGNIFICANT CHANGE UP (ref 8.5–10.1)
CHLORIDE SERPL-SCNC: 104 MMOL/L — SIGNIFICANT CHANGE UP (ref 96–108)
CO2 SERPL-SCNC: 25 MMOL/L — SIGNIFICANT CHANGE UP (ref 22–31)
COLOR FLD: YELLOW — SIGNIFICANT CHANGE UP
CREAT SERPL-MCNC: 1.06 MG/DL — SIGNIFICANT CHANGE UP (ref 0.5–1.3)
EGFR: 73 ML/MIN/1.73M2 — SIGNIFICANT CHANGE UP
EGFR: 73 ML/MIN/1.73M2 — SIGNIFICANT CHANGE UP
EOSINOPHIL # FLD: 0 % — SIGNIFICANT CHANGE UP
FLUID INTAKE SUBSTANCE CLASS: SIGNIFICANT CHANGE UP
FOLATE+VIT B12 SERBLD-IMP: 0 % — SIGNIFICANT CHANGE UP
GLUCOSE FLD-MCNC: 122 MG/DL — SIGNIFICANT CHANGE UP
GLUCOSE SERPL-MCNC: 92 MG/DL — SIGNIFICANT CHANGE UP (ref 70–99)
GRAM STN FLD: SIGNIFICANT CHANGE UP
HCT VFR BLD CALC: 37.4 % — LOW (ref 39–50)
HGB BLD-MCNC: 11.3 G/DL — LOW (ref 13–17)
LDH SERPL L TO P-CCNC: 115 U/L — SIGNIFICANT CHANGE UP
LDH SERPL L TO P-CCNC: 417 U/L — HIGH (ref 84–241)
LYMPHOCYTES # FLD: 51 % — SIGNIFICANT CHANGE UP
MAGNESIUM SERPL-MCNC: 2 MG/DL — SIGNIFICANT CHANGE UP (ref 1.6–2.6)
MCHC RBC-ENTMCNC: 22.8 PG — LOW (ref 27–34)
MCHC RBC-ENTMCNC: 30.2 G/DL — LOW (ref 32–36)
MCV RBC AUTO: 75.6 FL — LOW (ref 80–100)
MESOTHL CELL # FLD: SIGNIFICANT CHANGE UP
MONOS+MACROS # FLD: 43 % — SIGNIFICANT CHANGE UP
NEUTROPHILS-BODY FLUID: 6 % — SIGNIFICANT CHANGE UP
NRBC # BLD AUTO: 0 K/UL — SIGNIFICANT CHANGE UP (ref 0–0)
NRBC # FLD: 0 % — SIGNIFICANT CHANGE UP
NRBC # FLD: 0 K/UL — SIGNIFICANT CHANGE UP (ref 0–0)
NRBC BLD AUTO-RTO: 0 /100 WBCS — SIGNIFICANT CHANGE UP (ref 0–0)
NT-PROBNP SERPL-SCNC: HIGH PG/ML (ref 0–450)
OTHER CELLS FLD MANUAL: PRESENT — SIGNIFICANT CHANGE UP
PH FLD: 7.8 — SIGNIFICANT CHANGE UP
PHOSPHATE SERPL-MCNC: 3.6 MG/DL — SIGNIFICANT CHANGE UP (ref 2.5–4.5)
PLATELET # BLD AUTO: 271 K/UL — SIGNIFICANT CHANGE UP (ref 150–400)
PMV BLD: 10 FL — SIGNIFICANT CHANGE UP (ref 7–13)
POTASSIUM SERPL-MCNC: 3.4 MMOL/L — LOW (ref 3.5–5.3)
POTASSIUM SERPL-SCNC: 3.4 MMOL/L — LOW (ref 3.5–5.3)
PROT FLD-MCNC: 2.5 G/DL — SIGNIFICANT CHANGE UP
PROT SERPL-MCNC: 7.7 GM/DL — SIGNIFICANT CHANGE UP (ref 6–8.3)
RBC # BLD: 4.95 M/UL — SIGNIFICANT CHANGE UP (ref 4.2–5.8)
RBC # FLD: 16.8 % — HIGH (ref 10.3–14.5)
RCV VOL RI: 4000 CELLS/UL — SIGNIFICANT CHANGE UP
SODIUM SERPL-SCNC: 136 MMOL/L — SIGNIFICANT CHANGE UP (ref 135–145)
SPECIMEN SOURCE: SIGNIFICANT CHANGE UP
TOTAL NUCLEATED CELL COUNT, BODY FLUID: 2494 CELLS/UL — SIGNIFICANT CHANGE UP
TSH SERPL-MCNC: 2.79 UU/ML — SIGNIFICANT CHANGE UP (ref 0.34–4.82)
TUBE TYPE: SIGNIFICANT CHANGE UP
WBC # BLD: 10.48 K/UL — SIGNIFICANT CHANGE UP (ref 3.8–10.5)
WBC # FLD AUTO: 10.48 K/UL — SIGNIFICANT CHANGE UP (ref 3.8–10.5)
WBC COUNT.: 2265 CELLS/UL — SIGNIFICANT CHANGE UP

## 2025-05-05 PROCEDURE — 99223 1ST HOSP IP/OBS HIGH 75: CPT | Mod: FS,25

## 2025-05-05 PROCEDURE — 93306 TTE W/DOPPLER COMPLETE: CPT | Mod: 26

## 2025-05-05 PROCEDURE — 88108 CYTOPATH CONCENTRATE TECH: CPT | Mod: 26

## 2025-05-05 PROCEDURE — 88108 CYTOPATH CONCENTRATE TECH: CPT | Mod: 26,59

## 2025-05-05 PROCEDURE — 99222 1ST HOSP IP/OBS MODERATE 55: CPT | Mod: FS

## 2025-05-05 PROCEDURE — 99233 SBSQ HOSP IP/OBS HIGH 50: CPT

## 2025-05-05 PROCEDURE — 99223 1ST HOSP IP/OBS HIGH 75: CPT

## 2025-05-05 PROCEDURE — 32555 ASPIRATE PLEURA W/ IMAGING: CPT | Mod: RT

## 2025-05-05 PROCEDURE — 88305 TISSUE EXAM BY PATHOLOGIST: CPT | Mod: 26

## 2025-05-05 PROCEDURE — 71045 X-RAY EXAM CHEST 1 VIEW: CPT | Mod: 26

## 2025-05-05 RX ORDER — ZINC SULFATE 50(220)MG
220 CAPSULE ORAL AT BEDTIME
Refills: 0 | Status: DISCONTINUED | OUTPATIENT
Start: 2025-05-05 | End: 2025-05-10

## 2025-05-05 RX ORDER — DILTIAZEM HYDROCHLORIDE 240 MG/1
30 TABLET, EXTENDED RELEASE ORAL EVERY 6 HOURS
Refills: 0 | Status: DISCONTINUED | OUTPATIENT
Start: 2025-05-05 | End: 2025-05-06

## 2025-05-05 RX ORDER — MUPIROCIN CALCIUM 20 MG/G
1 CREAM TOPICAL
Refills: 0 | Status: DISCONTINUED | OUTPATIENT
Start: 2025-05-05 | End: 2025-05-10

## 2025-05-05 RX ORDER — METOPROLOL SUCCINATE 50 MG/1
75 TABLET, EXTENDED RELEASE ORAL ONCE
Refills: 0 | Status: COMPLETED | OUTPATIENT
Start: 2025-05-05 | End: 2025-05-05

## 2025-05-05 RX ADMIN — FUROSEMIDE 40 MILLIGRAM(S): 10 INJECTION INTRAMUSCULAR; INTRAVENOUS at 06:28

## 2025-05-05 RX ADMIN — Medication 20 MILLIEQUIVALENT(S): at 09:19

## 2025-05-05 RX ADMIN — Medication 650 MILLIGRAM(S): at 16:17

## 2025-05-05 RX ADMIN — Medication 2 MILLIGRAM(S): at 16:20

## 2025-05-05 RX ADMIN — Medication 2 MILLIGRAM(S): at 16:50

## 2025-05-05 RX ADMIN — METOPROLOL SUCCINATE 75 MILLIGRAM(S): 50 TABLET, EXTENDED RELEASE ORAL at 01:42

## 2025-05-05 RX ADMIN — DILTIAZEM HYDROCHLORIDE 30 MILLIGRAM(S): 240 TABLET, EXTENDED RELEASE ORAL at 18:01

## 2025-05-05 RX ADMIN — METOPROLOL SUCCINATE 75 MILLIGRAM(S): 50 TABLET, EXTENDED RELEASE ORAL at 21:49

## 2025-05-05 RX ADMIN — METOPROLOL SUCCINATE 75 MILLIGRAM(S): 50 TABLET, EXTENDED RELEASE ORAL at 08:43

## 2025-05-05 RX ADMIN — MUPIROCIN CALCIUM 1 APPLICATION(S): 20 CREAM TOPICAL at 21:49

## 2025-05-05 RX ADMIN — DILTIAZEM HYDROCHLORIDE 30 MILLIGRAM(S): 240 TABLET, EXTENDED RELEASE ORAL at 11:41

## 2025-05-05 RX ADMIN — ROSUVASTATIN CALCIUM 10 MILLIGRAM(S): 20 TABLET, FILM COATED ORAL at 21:48

## 2025-05-05 RX ADMIN — Medication 20 MILLIEQUIVALENT(S): at 11:41

## 2025-05-05 RX ADMIN — Medication 220 MILLIGRAM(S): at 21:49

## 2025-05-05 RX ADMIN — Medication 650 MILLIGRAM(S): at 16:47

## 2025-05-06 ENCOUNTER — APPOINTMENT (OUTPATIENT)
Dept: CARDIOTHORACIC SURGERY | Facility: CLINIC | Age: 75
End: 2025-05-06
Payer: MEDICARE

## 2025-05-06 ENCOUNTER — TRANSCRIPTION ENCOUNTER (OUTPATIENT)
Age: 75
End: 2025-05-06

## 2025-05-06 DIAGNOSIS — Z86.79 PERSONAL HISTORY OF OTHER DISEASES OF THE CIRCULATORY SYSTEM: ICD-10-CM

## 2025-05-06 DIAGNOSIS — J90 PLEURAL EFFUSION, NOT ELSEWHERE CLASSIFIED: ICD-10-CM

## 2025-05-06 DIAGNOSIS — I71.20 THORACIC AORTIC ANEURYSM, WITHOUT RUPTURE, UNSPECIFIED: ICD-10-CM

## 2025-05-06 LAB
24R-OH-CALCIDIOL SERPL-MCNC: 35 NG/ML — SIGNIFICANT CHANGE UP
ALBUMIN SERPL ELPH-MCNC: 3 G/DL — LOW (ref 3.3–5)
ALP SERPL-CCNC: 51 U/L — SIGNIFICANT CHANGE UP (ref 40–120)
ALT FLD-CCNC: 14 U/L — SIGNIFICANT CHANGE UP (ref 12–78)
ANION GAP SERPL CALC-SCNC: 4 MMOL/L — LOW (ref 5–17)
AST SERPL-CCNC: 16 U/L — SIGNIFICANT CHANGE UP (ref 15–37)
BASE EXCESS BLDV CALC-SCNC: 2.5 MMOL/L — SIGNIFICANT CHANGE UP (ref -2–3)
BASOPHILS # BLD AUTO: 0.1 K/UL — SIGNIFICANT CHANGE UP (ref 0–0.2)
BASOPHILS NFR BLD AUTO: 1 % — SIGNIFICANT CHANGE UP (ref 0–2)
BILIRUB SERPL-MCNC: 0.8 MG/DL — SIGNIFICANT CHANGE UP (ref 0.2–1.2)
BUN SERPL-MCNC: 20 MG/DL — SIGNIFICANT CHANGE UP (ref 7–23)
CALCIUM SERPL-MCNC: 9.1 MG/DL — SIGNIFICANT CHANGE UP (ref 8.5–10.1)
CHLORIDE SERPL-SCNC: 104 MMOL/L — SIGNIFICANT CHANGE UP (ref 96–108)
CHOLEST SERPL-MCNC: 75 MG/DL — SIGNIFICANT CHANGE UP
CO2 SERPL-SCNC: 28 MMOL/L — SIGNIFICANT CHANGE UP (ref 22–31)
COMMENT - FLUIDS: SIGNIFICANT CHANGE UP
CREAT SERPL-MCNC: 0.91 MG/DL — SIGNIFICANT CHANGE UP (ref 0.5–1.3)
CRP SERPL-MCNC: 10.5 MG/L — HIGH (ref 0–5)
EGFR: 88 ML/MIN/1.73M2 — SIGNIFICANT CHANGE UP
EGFR: 88 ML/MIN/1.73M2 — SIGNIFICANT CHANGE UP
EOSINOPHIL # BLD AUTO: 0.4 K/UL — SIGNIFICANT CHANGE UP (ref 0–0.5)
EOSINOPHIL NFR BLD AUTO: 4.1 % — SIGNIFICANT CHANGE UP (ref 0–6)
FERRITIN SERPL-MCNC: 34 NG/ML — SIGNIFICANT CHANGE UP (ref 30–400)
FOLATE SERPL-MCNC: >20 NG/ML — SIGNIFICANT CHANGE UP
GLUCOSE SERPL-MCNC: 94 MG/DL — SIGNIFICANT CHANGE UP (ref 70–99)
HCO3 BLDV-SCNC: 29 MMOL/L — SIGNIFICANT CHANGE UP (ref 22–29)
HCT VFR BLD CALC: 35.1 % — LOW (ref 39–50)
HDLC SERPL-MCNC: 27 MG/DL — LOW
HGB BLD-MCNC: 10.7 G/DL — LOW (ref 13–17)
IMM GRANULOCYTES # BLD AUTO: 0.04 K/UL — SIGNIFICANT CHANGE UP (ref 0–0.07)
IMM GRANULOCYTES NFR BLD AUTO: 0.4 % — SIGNIFICANT CHANGE UP (ref 0–0.9)
IMMATURE RETICULOCYTE FRACTION %: 18 % — SIGNIFICANT CHANGE UP
IRON SATN MFR SERPL: 25 UG/DL — LOW (ref 45–165)
IRON SATN MFR SERPL: 7 % — LOW (ref 16–55)
LDLC SERPL-MCNC: 33 MG/DL — SIGNIFICANT CHANGE UP
LIPID PNL WITH DIRECT LDL SERPL: 33 MG/DL — SIGNIFICANT CHANGE UP
LYMPHOCYTES # BLD AUTO: 1.52 K/UL — SIGNIFICANT CHANGE UP (ref 1–3.3)
LYMPHOCYTES NFR BLD AUTO: 15.5 % — SIGNIFICANT CHANGE UP (ref 13–44)
MAGNESIUM SERPL-MCNC: 2 MG/DL — SIGNIFICANT CHANGE UP (ref 1.6–2.6)
MCHC RBC-ENTMCNC: 23.1 PG — LOW (ref 27–34)
MCHC RBC-ENTMCNC: 30.5 G/DL — LOW (ref 32–36)
MCV RBC AUTO: 75.6 FL — LOW (ref 80–100)
MONOCYTES # BLD AUTO: 1.12 K/UL — HIGH (ref 0–0.9)
MONOCYTES NFR BLD AUTO: 11.5 % — SIGNIFICANT CHANGE UP (ref 2–14)
NEUTROPHILS # BLD AUTO: 6.6 K/UL — SIGNIFICANT CHANGE UP (ref 1.8–7.4)
NEUTROPHILS NFR BLD AUTO: 67.5 % — SIGNIFICANT CHANGE UP (ref 43–77)
NONHDLC SERPL-MCNC: 48 MG/DL — SIGNIFICANT CHANGE UP
NRBC # BLD AUTO: 0 K/UL — SIGNIFICANT CHANGE UP (ref 0–0)
NRBC # FLD: 0 K/UL — SIGNIFICANT CHANGE UP (ref 0–0)
NRBC BLD AUTO-RTO: 0 /100 WBCS — SIGNIFICANT CHANGE UP (ref 0–0)
NT-PROBNP SERPL-SCNC: 3232 PG/ML — HIGH (ref 0–450)
PATHOLOGIST REVIEW: SIGNIFICANT CHANGE UP
PCO2 BLDV: 53 MMHG — SIGNIFICANT CHANGE UP (ref 42–55)
PH BLDV: 7.35 — SIGNIFICANT CHANGE UP (ref 7.32–7.43)
PHOSPHATE SERPL-MCNC: 3.8 MG/DL — SIGNIFICANT CHANGE UP (ref 2.5–4.5)
PLATELET # BLD AUTO: 234 K/UL — SIGNIFICANT CHANGE UP (ref 150–400)
PMV BLD: 9.9 FL — SIGNIFICANT CHANGE UP (ref 7–13)
PO2 BLDV: 34 MMHG — SIGNIFICANT CHANGE UP (ref 25–45)
POTASSIUM SERPL-MCNC: 4.3 MMOL/L — SIGNIFICANT CHANGE UP (ref 3.5–5.3)
POTASSIUM SERPL-SCNC: 4.3 MMOL/L — SIGNIFICANT CHANGE UP (ref 3.5–5.3)
PROCALCITONIN SERPL-MCNC: 0.03 NG/ML — SIGNIFICANT CHANGE UP (ref 0.02–0.1)
PROT SERPL-MCNC: 6.6 GM/DL — SIGNIFICANT CHANGE UP (ref 6–8.3)
RBC # BLD: 4.64 M/UL — SIGNIFICANT CHANGE UP (ref 4.2–5.8)
RBC # BLD: 4.64 M/UL — SIGNIFICANT CHANGE UP (ref 4.2–5.8)
RBC # FLD: 16.9 % — HIGH (ref 10.3–14.5)
RETICS #: 46.9 K/UL — SIGNIFICANT CHANGE UP (ref 25–125)
RETICS/RBC NFR: 1 % — SIGNIFICANT CHANGE UP (ref 0.5–2.5)
RETICULOCYTE HEMOGLOBIN EQUIVALENT: 24.8 PG — LOW (ref 36–38.6)
SAO2 % BLDV: 55 % — LOW (ref 67–88)
SODIUM SERPL-SCNC: 136 MMOL/L — SIGNIFICANT CHANGE UP (ref 135–145)
TIBC SERPL-MCNC: 376 UG/DL — SIGNIFICANT CHANGE UP (ref 220–430)
TRIGL SERPL-MCNC: 64 MG/DL — SIGNIFICANT CHANGE UP
TROPONIN I, HIGH SENSITIVITY RESULT: 54.79 NG/L — SIGNIFICANT CHANGE UP
TSH SERPL-MCNC: 1.15 UU/ML — SIGNIFICANT CHANGE UP (ref 0.34–4.82)
UIBC SERPL-MCNC: 351 UG/DL — SIGNIFICANT CHANGE UP (ref 110–370)
VIT B12 SERPL-MCNC: 550 PG/ML — SIGNIFICANT CHANGE UP (ref 232–1245)
WBC # BLD: 9.78 K/UL — SIGNIFICANT CHANGE UP (ref 3.8–10.5)
WBC # FLD AUTO: 9.78 K/UL — SIGNIFICANT CHANGE UP (ref 3.8–10.5)

## 2025-05-06 PROCEDURE — 99232 SBSQ HOSP IP/OBS MODERATE 35: CPT

## 2025-05-06 PROCEDURE — 93970 EXTREMITY STUDY: CPT | Mod: 26

## 2025-05-06 PROCEDURE — 93010 ELECTROCARDIOGRAM REPORT: CPT

## 2025-05-06 PROCEDURE — 71045 X-RAY EXAM CHEST 1 VIEW: CPT | Mod: 26

## 2025-05-06 PROCEDURE — 99213 OFFICE O/P EST LOW 20 MIN: CPT | Mod: 93

## 2025-05-06 PROCEDURE — 99233 SBSQ HOSP IP/OBS HIGH 50: CPT

## 2025-05-06 PROCEDURE — 99231 SBSQ HOSP IP/OBS SF/LOW 25: CPT

## 2025-05-06 RX ORDER — DILTIAZEM HYDROCHLORIDE 240 MG/1
180 TABLET, EXTENDED RELEASE ORAL DAILY
Refills: 0 | Status: DISCONTINUED | OUTPATIENT
Start: 2025-05-06 | End: 2025-05-07

## 2025-05-06 RX ORDER — DILTIAZEM HYDROCHLORIDE 240 MG/1
30 TABLET, EXTENDED RELEASE ORAL ONCE
Refills: 0 | Status: COMPLETED | OUTPATIENT
Start: 2025-05-06 | End: 2025-05-06

## 2025-05-06 RX ORDER — DILTIAZEM HYDROCHLORIDE 240 MG/1
10 TABLET, EXTENDED RELEASE ORAL ONCE
Refills: 0 | Status: DISCONTINUED | OUTPATIENT
Start: 2025-05-06 | End: 2025-05-06

## 2025-05-06 RX ORDER — FERROUS SULFATE 137(45) MG
325 TABLET, EXTENDED RELEASE ORAL DAILY
Refills: 0 | Status: DISCONTINUED | OUTPATIENT
Start: 2025-05-06 | End: 2025-05-10

## 2025-05-06 RX ADMIN — MUPIROCIN CALCIUM 1 APPLICATION(S): 20 CREAM TOPICAL at 10:57

## 2025-05-06 RX ADMIN — DILTIAZEM HYDROCHLORIDE 30 MILLIGRAM(S): 240 TABLET, EXTENDED RELEASE ORAL at 06:16

## 2025-05-06 RX ADMIN — METOPROLOL SUCCINATE 75 MILLIGRAM(S): 50 TABLET, EXTENDED RELEASE ORAL at 10:55

## 2025-05-06 RX ADMIN — DILTIAZEM HYDROCHLORIDE 180 MILLIGRAM(S): 240 TABLET, EXTENDED RELEASE ORAL at 10:55

## 2025-05-06 RX ADMIN — MUPIROCIN CALCIUM 1 APPLICATION(S): 20 CREAM TOPICAL at 22:42

## 2025-05-06 RX ADMIN — FUROSEMIDE 40 MILLIGRAM(S): 10 INJECTION INTRAMUSCULAR; INTRAVENOUS at 06:16

## 2025-05-06 RX ADMIN — METOPROLOL SUCCINATE 75 MILLIGRAM(S): 50 TABLET, EXTENDED RELEASE ORAL at 22:41

## 2025-05-06 RX ADMIN — ROSUVASTATIN CALCIUM 10 MILLIGRAM(S): 20 TABLET, FILM COATED ORAL at 22:42

## 2025-05-06 RX ADMIN — Medication 220 MILLIGRAM(S): at 22:42

## 2025-05-06 RX ADMIN — DILTIAZEM HYDROCHLORIDE 30 MILLIGRAM(S): 240 TABLET, EXTENDED RELEASE ORAL at 02:14

## 2025-05-06 RX ADMIN — DILTIAZEM HYDROCHLORIDE 30 MILLIGRAM(S): 240 TABLET, EXTENDED RELEASE ORAL at 00:05

## 2025-05-07 DIAGNOSIS — J96.01 ACUTE RESPIRATORY FAILURE WITH HYPOXIA: ICD-10-CM

## 2025-05-07 LAB
ANION GAP SERPL CALC-SCNC: 3 MMOL/L — LOW (ref 5–17)
BUN SERPL-MCNC: 21 MG/DL — SIGNIFICANT CHANGE UP (ref 7–23)
CALCIUM SERPL-MCNC: 8.9 MG/DL — SIGNIFICANT CHANGE UP (ref 8.5–10.1)
CHLORIDE SERPL-SCNC: 106 MMOL/L — SIGNIFICANT CHANGE UP (ref 96–108)
CO2 SERPL-SCNC: 27 MMOL/L — SIGNIFICANT CHANGE UP (ref 22–31)
CREAT SERPL-MCNC: 0.98 MG/DL — SIGNIFICANT CHANGE UP (ref 0.5–1.3)
EGFR: 80 ML/MIN/1.73M2 — SIGNIFICANT CHANGE UP
EGFR: 80 ML/MIN/1.73M2 — SIGNIFICANT CHANGE UP
GLUCOSE SERPL-MCNC: 91 MG/DL — SIGNIFICANT CHANGE UP (ref 70–99)
HCT VFR BLD CALC: 33.7 % — LOW (ref 39–50)
HGB BLD-MCNC: 10.3 G/DL — LOW (ref 13–17)
MAGNESIUM SERPL-MCNC: 1.8 MG/DL — SIGNIFICANT CHANGE UP (ref 1.6–2.6)
MCHC RBC-ENTMCNC: 22.7 PG — LOW (ref 27–34)
MCHC RBC-ENTMCNC: 30.6 G/DL — LOW (ref 32–36)
MCV RBC AUTO: 74.4 FL — LOW (ref 80–100)
NON-GYNECOLOGICAL CYTOLOGY STUDY: SIGNIFICANT CHANGE UP
NRBC # BLD AUTO: 0 K/UL — SIGNIFICANT CHANGE UP (ref 0–0)
NRBC # FLD: 0 K/UL — SIGNIFICANT CHANGE UP (ref 0–0)
NRBC BLD AUTO-RTO: 0 /100 WBCS — SIGNIFICANT CHANGE UP (ref 0–0)
NT-PROBNP SERPL-SCNC: 1664 PG/ML — HIGH (ref 0–450)
PHOSPHATE SERPL-MCNC: 3.6 MG/DL — SIGNIFICANT CHANGE UP (ref 2.5–4.5)
PLATELET # BLD AUTO: 253 K/UL — SIGNIFICANT CHANGE UP (ref 150–400)
PMV BLD: 10.1 FL — SIGNIFICANT CHANGE UP (ref 7–13)
POTASSIUM SERPL-MCNC: 3.6 MMOL/L — SIGNIFICANT CHANGE UP (ref 3.5–5.3)
POTASSIUM SERPL-SCNC: 3.6 MMOL/L — SIGNIFICANT CHANGE UP (ref 3.5–5.3)
RBC # BLD: 4.53 M/UL — SIGNIFICANT CHANGE UP (ref 4.2–5.8)
RBC # FLD: 16.9 % — HIGH (ref 10.3–14.5)
SODIUM SERPL-SCNC: 136 MMOL/L — SIGNIFICANT CHANGE UP (ref 135–145)
TROPONIN I, HIGH SENSITIVITY RESULT: 38.43 NG/L — SIGNIFICANT CHANGE UP
WBC # BLD: 10.01 K/UL — SIGNIFICANT CHANGE UP (ref 3.8–10.5)
WBC # FLD AUTO: 10.01 K/UL — SIGNIFICANT CHANGE UP (ref 3.8–10.5)

## 2025-05-07 PROCEDURE — 93010 ELECTROCARDIOGRAM REPORT: CPT

## 2025-05-07 PROCEDURE — 99223 1ST HOSP IP/OBS HIGH 75: CPT

## 2025-05-07 PROCEDURE — 99233 SBSQ HOSP IP/OBS HIGH 50: CPT

## 2025-05-07 RX ORDER — MAGNESIUM OXIDE 400 MG
400 TABLET ORAL DAILY
Refills: 0 | Status: DISCONTINUED | OUTPATIENT
Start: 2025-05-07 | End: 2025-05-07

## 2025-05-07 RX ORDER — MAGNESIUM SULFATE 500 MG/ML
1 SYRINGE (ML) INJECTION ONCE
Refills: 0 | Status: COMPLETED | OUTPATIENT
Start: 2025-05-07 | End: 2025-05-07

## 2025-05-07 RX ORDER — DIGOXIN 250 UG/1
250 TABLET ORAL DAILY
Refills: 0 | Status: DISCONTINUED | OUTPATIENT
Start: 2025-05-08 | End: 2025-05-09

## 2025-05-07 RX ORDER — FUROSEMIDE 10 MG/ML
40 INJECTION INTRAMUSCULAR; INTRAVENOUS DAILY
Refills: 0 | Status: DISCONTINUED | OUTPATIENT
Start: 2025-05-07 | End: 2025-05-07

## 2025-05-07 RX ORDER — BUMETANIDE 1 MG/1
1 TABLET ORAL DAILY
Refills: 0 | Status: DISCONTINUED | OUTPATIENT
Start: 2025-05-07 | End: 2025-05-08

## 2025-05-07 RX ORDER — DAPAGLIFLOZIN 5 MG/1
10 TABLET, FILM COATED ORAL DAILY
Refills: 0 | Status: DISCONTINUED | OUTPATIENT
Start: 2025-05-07 | End: 2025-05-07

## 2025-05-07 RX ORDER — DIGOXIN 250 UG/1
500 TABLET ORAL EVERY 6 HOURS
Refills: 0 | Status: COMPLETED | OUTPATIENT
Start: 2025-05-07 | End: 2025-05-07

## 2025-05-07 RX ORDER — METOPROLOL SUCCINATE 50 MG/1
75 TABLET, EXTENDED RELEASE ORAL EVERY 12 HOURS
Refills: 0 | Status: DISCONTINUED | OUTPATIENT
Start: 2025-05-07 | End: 2025-05-09

## 2025-05-07 RX ADMIN — DIGOXIN 500 MICROGRAM(S): 250 TABLET ORAL at 10:50

## 2025-05-07 RX ADMIN — Medication 20 MILLIEQUIVALENT(S): at 19:23

## 2025-05-07 RX ADMIN — Medication 325 MILLIGRAM(S): at 10:51

## 2025-05-07 RX ADMIN — METOPROLOL SUCCINATE 75 MILLIGRAM(S): 50 TABLET, EXTENDED RELEASE ORAL at 21:10

## 2025-05-07 RX ADMIN — Medication 220 MILLIGRAM(S): at 21:11

## 2025-05-07 RX ADMIN — Medication 500 MILLIGRAM(S): at 10:49

## 2025-05-07 RX ADMIN — MUPIROCIN CALCIUM 1 APPLICATION(S): 20 CREAM TOPICAL at 21:13

## 2025-05-07 RX ADMIN — MUPIROCIN CALCIUM 1 APPLICATION(S): 20 CREAM TOPICAL at 10:47

## 2025-05-07 RX ADMIN — DIGOXIN 500 MICROGRAM(S): 250 TABLET ORAL at 17:12

## 2025-05-07 RX ADMIN — ROSUVASTATIN CALCIUM 10 MILLIGRAM(S): 20 TABLET, FILM COATED ORAL at 21:13

## 2025-05-07 RX ADMIN — FUROSEMIDE 40 MILLIGRAM(S): 10 INJECTION INTRAMUSCULAR; INTRAVENOUS at 06:06

## 2025-05-07 RX ADMIN — BUMETANIDE 1 MILLIGRAM(S): 1 TABLET ORAL at 10:50

## 2025-05-07 RX ADMIN — METOPROLOL SUCCINATE 75 MILLIGRAM(S): 50 TABLET, EXTENDED RELEASE ORAL at 10:53

## 2025-05-07 RX ADMIN — Medication 100 GRAM(S): at 21:10

## 2025-05-08 LAB
ANION GAP SERPL CALC-SCNC: 4 MMOL/L — LOW (ref 5–17)
BLD GP AB SCN SERPL QL: SIGNIFICANT CHANGE UP
BUN SERPL-MCNC: 17 MG/DL — SIGNIFICANT CHANGE UP (ref 7–23)
CALCIUM SERPL-MCNC: 9.4 MG/DL — SIGNIFICANT CHANGE UP (ref 8.5–10.1)
CHLORIDE SERPL-SCNC: 106 MMOL/L — SIGNIFICANT CHANGE UP (ref 96–108)
CO2 SERPL-SCNC: 27 MMOL/L — SIGNIFICANT CHANGE UP (ref 22–31)
CREAT SERPL-MCNC: 0.95 MG/DL — SIGNIFICANT CHANGE UP (ref 0.5–1.3)
EGFR: 83 ML/MIN/1.73M2 — SIGNIFICANT CHANGE UP
EGFR: 83 ML/MIN/1.73M2 — SIGNIFICANT CHANGE UP
GLUCOSE SERPL-MCNC: 96 MG/DL — SIGNIFICANT CHANGE UP (ref 70–99)
MAGNESIUM SERPL-MCNC: 2.2 MG/DL — SIGNIFICANT CHANGE UP (ref 1.6–2.6)
NT-PROBNP SERPL-SCNC: 1805 PG/ML — HIGH (ref 0–450)
PHOSPHATE SERPL-MCNC: 3.3 MG/DL — SIGNIFICANT CHANGE UP (ref 2.5–4.5)
POTASSIUM SERPL-MCNC: 3.6 MMOL/L — SIGNIFICANT CHANGE UP (ref 3.5–5.3)
POTASSIUM SERPL-SCNC: 3.6 MMOL/L — SIGNIFICANT CHANGE UP (ref 3.5–5.3)
SODIUM SERPL-SCNC: 137 MMOL/L — SIGNIFICANT CHANGE UP (ref 135–145)

## 2025-05-08 PROCEDURE — 99233 SBSQ HOSP IP/OBS HIGH 50: CPT

## 2025-05-08 PROCEDURE — 99232 SBSQ HOSP IP/OBS MODERATE 35: CPT

## 2025-05-08 PROCEDURE — 70553 MRI BRAIN STEM W/O & W/DYE: CPT | Mod: 26

## 2025-05-08 PROCEDURE — 99222 1ST HOSP IP/OBS MODERATE 55: CPT

## 2025-05-08 RX ORDER — SACUBITRIL AND VALSARTAN 6; 6 MG/1; MG/1
1 PELLET ORAL
Refills: 0 | Status: DISCONTINUED | OUTPATIENT
Start: 2025-05-08 | End: 2025-05-10

## 2025-05-08 RX ORDER — BUMETANIDE 1 MG/1
1 TABLET ORAL DAILY
Refills: 0 | Status: DISCONTINUED | OUTPATIENT
Start: 2025-05-08 | End: 2025-05-09

## 2025-05-08 RX ADMIN — METOPROLOL SUCCINATE 75 MILLIGRAM(S): 50 TABLET, EXTENDED RELEASE ORAL at 09:26

## 2025-05-08 RX ADMIN — DIGOXIN 250 MICROGRAM(S): 250 TABLET ORAL at 09:25

## 2025-05-08 RX ADMIN — SACUBITRIL AND VALSARTAN 1 TABLET(S): 6; 6 PELLET ORAL at 22:16

## 2025-05-08 RX ADMIN — MUPIROCIN CALCIUM 1 APPLICATION(S): 20 CREAM TOPICAL at 09:22

## 2025-05-08 RX ADMIN — BUMETANIDE 1 MILLIGRAM(S): 1 TABLET ORAL at 09:25

## 2025-05-08 RX ADMIN — Medication 500 MILLIGRAM(S): at 09:26

## 2025-05-08 RX ADMIN — Medication 325 MILLIGRAM(S): at 09:25

## 2025-05-08 RX ADMIN — METOPROLOL SUCCINATE 75 MILLIGRAM(S): 50 TABLET, EXTENDED RELEASE ORAL at 22:16

## 2025-05-08 RX ADMIN — ROSUVASTATIN CALCIUM 10 MILLIGRAM(S): 20 TABLET, FILM COATED ORAL at 22:17

## 2025-05-08 RX ADMIN — SACUBITRIL AND VALSARTAN 1 TABLET(S): 6; 6 PELLET ORAL at 15:54

## 2025-05-08 RX ADMIN — Medication 220 MILLIGRAM(S): at 22:16

## 2025-05-08 RX ADMIN — MUPIROCIN CALCIUM 1 APPLICATION(S): 20 CREAM TOPICAL at 22:21

## 2025-05-09 LAB
ANION GAP SERPL CALC-SCNC: 5 MMOL/L — SIGNIFICANT CHANGE UP (ref 5–17)
BUN SERPL-MCNC: 13 MG/DL — SIGNIFICANT CHANGE UP (ref 7–23)
CALCIUM SERPL-MCNC: 8.9 MG/DL — SIGNIFICANT CHANGE UP (ref 8.5–10.1)
CHLORIDE SERPL-SCNC: 105 MMOL/L — SIGNIFICANT CHANGE UP (ref 96–108)
CO2 SERPL-SCNC: 27 MMOL/L — SIGNIFICANT CHANGE UP (ref 22–31)
CORTIS AM PEAK SERPL-MCNC: 13.4 UG/DL — SIGNIFICANT CHANGE UP (ref 6–18.4)
CREAT SERPL-MCNC: 0.9 MG/DL — SIGNIFICANT CHANGE UP (ref 0.5–1.3)
DIGOXIN SERPL-MCNC: 0.92 NG/ML — SIGNIFICANT CHANGE UP (ref 0.8–2)
EGFR: 89 ML/MIN/1.73M2 — SIGNIFICANT CHANGE UP
EGFR: 89 ML/MIN/1.73M2 — SIGNIFICANT CHANGE UP
GLUCOSE SERPL-MCNC: 95 MG/DL — SIGNIFICANT CHANGE UP (ref 70–99)
INR BLD: 1.18 RATIO — HIGH (ref 0.85–1.16)
MAGNESIUM RBC-MCNC: 4.6 MG/DL — SIGNIFICANT CHANGE UP (ref 3.7–7)
MAGNESIUM SERPL-MCNC: 2.1 MG/DL — SIGNIFICANT CHANGE UP (ref 1.6–2.6)
NT-PROBNP SERPL-SCNC: 1977 PG/ML — HIGH (ref 0–450)
PHOSPHATE SERPL-MCNC: 3.2 MG/DL — SIGNIFICANT CHANGE UP (ref 2.5–4.5)
POTASSIUM SERPL-MCNC: 3.6 MMOL/L — SIGNIFICANT CHANGE UP (ref 3.5–5.3)
POTASSIUM SERPL-SCNC: 3.6 MMOL/L — SIGNIFICANT CHANGE UP (ref 3.5–5.3)
PROTHROM AB SERPL-ACNC: 13.6 SEC — HIGH (ref 9.9–13.4)
SODIUM SERPL-SCNC: 137 MMOL/L — SIGNIFICANT CHANGE UP (ref 135–145)

## 2025-05-09 PROCEDURE — 93010 ELECTROCARDIOGRAM REPORT: CPT

## 2025-05-09 PROCEDURE — 71045 X-RAY EXAM CHEST 1 VIEW: CPT | Mod: 26

## 2025-05-09 PROCEDURE — 99232 SBSQ HOSP IP/OBS MODERATE 35: CPT

## 2025-05-09 PROCEDURE — 33207 INSERT HEART PM VENTRICULAR: CPT

## 2025-05-09 PROCEDURE — 99233 SBSQ HOSP IP/OBS HIGH 50: CPT

## 2025-05-09 PROCEDURE — 93650 ICAR CATH ABLTJ AV NODE FUNC: CPT

## 2025-05-09 RX ORDER — METOPROLOL SUCCINATE 50 MG/1
75 TABLET, EXTENDED RELEASE ORAL DAILY
Refills: 0 | Status: DISCONTINUED | OUTPATIENT
Start: 2025-05-09 | End: 2025-05-10

## 2025-05-09 RX ORDER — BUMETANIDE 1 MG/1
1 TABLET ORAL DAILY
Refills: 0 | Status: DISCONTINUED | OUTPATIENT
Start: 2025-05-09 | End: 2025-05-10

## 2025-05-09 RX ORDER — DAPAGLIFLOZIN 5 MG/1
10 TABLET, FILM COATED ORAL DAILY
Refills: 0 | Status: DISCONTINUED | OUTPATIENT
Start: 2025-05-10 | End: 2025-05-10

## 2025-05-09 RX ORDER — CEFAZOLIN SODIUM IN 0.9 % NACL 3 G/100 ML
1000 INTRAVENOUS SOLUTION, PIGGYBACK (ML) INTRAVENOUS EVERY 8 HOURS
Refills: 0 | Status: COMPLETED | OUTPATIENT
Start: 2025-05-09 | End: 2025-05-10

## 2025-05-09 RX ADMIN — SACUBITRIL AND VALSARTAN 1 TABLET(S): 6; 6 PELLET ORAL at 13:10

## 2025-05-09 RX ADMIN — ROSUVASTATIN CALCIUM 10 MILLIGRAM(S): 20 TABLET, FILM COATED ORAL at 21:18

## 2025-05-09 RX ADMIN — Medication 1000 MILLIGRAM(S): at 16:52

## 2025-05-09 RX ADMIN — MUPIROCIN CALCIUM 1 APPLICATION(S): 20 CREAM TOPICAL at 21:20

## 2025-05-09 RX ADMIN — BUMETANIDE 1 MILLIGRAM(S): 1 TABLET ORAL at 13:09

## 2025-05-09 RX ADMIN — SACUBITRIL AND VALSARTAN 1 TABLET(S): 6; 6 PELLET ORAL at 21:18

## 2025-05-09 RX ADMIN — METOPROLOL SUCCINATE 75 MILLIGRAM(S): 50 TABLET, EXTENDED RELEASE ORAL at 13:17

## 2025-05-09 RX ADMIN — Medication 500 MILLIGRAM(S): at 13:10

## 2025-05-09 RX ADMIN — MUPIROCIN CALCIUM 1 APPLICATION(S): 20 CREAM TOPICAL at 13:08

## 2025-05-09 RX ADMIN — Medication 325 MILLIGRAM(S): at 13:09

## 2025-05-09 RX ADMIN — Medication 220 MILLIGRAM(S): at 21:18

## 2025-05-10 ENCOUNTER — TRANSCRIPTION ENCOUNTER (OUTPATIENT)
Age: 75
End: 2025-05-10

## 2025-05-10 VITALS
HEART RATE: 84 BPM | SYSTOLIC BLOOD PRESSURE: 114 MMHG | OXYGEN SATURATION: 97 % | DIASTOLIC BLOOD PRESSURE: 52 MMHG | RESPIRATION RATE: 16 BRPM | TEMPERATURE: 99 F

## 2025-05-10 LAB
ALBUMIN SERPL ELPH-MCNC: 2.7 G/DL — LOW (ref 3.3–5)
ALP SERPL-CCNC: 53 U/L — SIGNIFICANT CHANGE UP (ref 40–120)
ALT FLD-CCNC: 14 U/L — SIGNIFICANT CHANGE UP (ref 12–78)
ANION GAP SERPL CALC-SCNC: 7 MMOL/L — SIGNIFICANT CHANGE UP (ref 5–17)
AST SERPL-CCNC: 21 U/L — SIGNIFICANT CHANGE UP (ref 15–37)
BASOPHILS # BLD AUTO: 0.1 K/UL — SIGNIFICANT CHANGE UP (ref 0–0.2)
BASOPHILS NFR BLD AUTO: 1 % — SIGNIFICANT CHANGE UP (ref 0–2)
BILIRUB DIRECT SERPL-MCNC: 0.2 MG/DL — SIGNIFICANT CHANGE UP (ref 0–0.3)
BILIRUB INDIRECT FLD-MCNC: 0.3 MG/DL — SIGNIFICANT CHANGE UP (ref 0.2–1)
BILIRUB SERPL-MCNC: 0.5 MG/DL — SIGNIFICANT CHANGE UP (ref 0.2–1.2)
BUN SERPL-MCNC: 15 MG/DL — SIGNIFICANT CHANGE UP (ref 7–23)
CALCIUM SERPL-MCNC: 8.8 MG/DL — SIGNIFICANT CHANGE UP (ref 8.5–10.1)
CHLORIDE SERPL-SCNC: 107 MMOL/L — SIGNIFICANT CHANGE UP (ref 96–108)
CO2 SERPL-SCNC: 25 MMOL/L — SIGNIFICANT CHANGE UP (ref 22–31)
CREAT SERPL-MCNC: 0.96 MG/DL — SIGNIFICANT CHANGE UP (ref 0.5–1.3)
CULTURE RESULTS: NO GROWTH — SIGNIFICANT CHANGE UP
EGFR: 82 ML/MIN/1.73M2 — SIGNIFICANT CHANGE UP
EGFR: 82 ML/MIN/1.73M2 — SIGNIFICANT CHANGE UP
EOSINOPHIL # BLD AUTO: 0.51 K/UL — HIGH (ref 0–0.5)
EOSINOPHIL NFR BLD AUTO: 5.1 % — SIGNIFICANT CHANGE UP (ref 0–6)
GLUCOSE SERPL-MCNC: 101 MG/DL — HIGH (ref 70–99)
HCT VFR BLD CALC: 37.2 % — LOW (ref 39–50)
HGB BLD-MCNC: 11.2 G/DL — LOW (ref 13–17)
IMM GRANULOCYTES # BLD AUTO: 0.02 K/UL — SIGNIFICANT CHANGE UP (ref 0–0.07)
IMM GRANULOCYTES NFR BLD AUTO: 0.2 % — SIGNIFICANT CHANGE UP (ref 0–0.9)
LYMPHOCYTES # BLD AUTO: 1.57 K/UL — SIGNIFICANT CHANGE UP (ref 1–3.3)
LYMPHOCYTES NFR BLD AUTO: 15.7 % — SIGNIFICANT CHANGE UP (ref 13–44)
MAGNESIUM SERPL-MCNC: 2.1 MG/DL — SIGNIFICANT CHANGE UP (ref 1.6–2.6)
MCHC RBC-ENTMCNC: 22.7 PG — LOW (ref 27–34)
MCHC RBC-ENTMCNC: 30.1 G/DL — LOW (ref 32–36)
MCV RBC AUTO: 75.3 FL — LOW (ref 80–100)
MONOCYTES # BLD AUTO: 1.18 K/UL — HIGH (ref 0–0.9)
MONOCYTES NFR BLD AUTO: 11.8 % — SIGNIFICANT CHANGE UP (ref 2–14)
NEUTROPHILS # BLD AUTO: 6.59 K/UL — SIGNIFICANT CHANGE UP (ref 1.8–7.4)
NEUTROPHILS NFR BLD AUTO: 66.2 % — SIGNIFICANT CHANGE UP (ref 43–77)
NRBC # BLD AUTO: 0 K/UL — SIGNIFICANT CHANGE UP (ref 0–0)
NRBC # FLD: 0 K/UL — SIGNIFICANT CHANGE UP (ref 0–0)
NRBC BLD AUTO-RTO: 0 /100 WBCS — SIGNIFICANT CHANGE UP (ref 0–0)
NT-PROBNP SERPL-SCNC: 1872 PG/ML — HIGH (ref 0–450)
PHOSPHATE SERPL-MCNC: 3.3 MG/DL — SIGNIFICANT CHANGE UP (ref 2.5–4.5)
PLATELET # BLD AUTO: 242 K/UL — SIGNIFICANT CHANGE UP (ref 150–400)
PMV BLD: 9.6 FL — SIGNIFICANT CHANGE UP (ref 7–13)
POTASSIUM SERPL-MCNC: 3.7 MMOL/L — SIGNIFICANT CHANGE UP (ref 3.5–5.3)
POTASSIUM SERPL-SCNC: 3.7 MMOL/L — SIGNIFICANT CHANGE UP (ref 3.5–5.3)
PROT SERPL-MCNC: 6.2 GM/DL — SIGNIFICANT CHANGE UP (ref 6–8.3)
RBC # BLD: 4.94 M/UL — SIGNIFICANT CHANGE UP (ref 4.2–5.8)
RBC # FLD: 17.2 % — HIGH (ref 10.3–14.5)
SODIUM SERPL-SCNC: 139 MMOL/L — SIGNIFICANT CHANGE UP (ref 135–145)
SPECIMEN SOURCE: SIGNIFICANT CHANGE UP
WBC # BLD: 9.97 K/UL — SIGNIFICANT CHANGE UP (ref 3.8–10.5)
WBC # FLD AUTO: 9.97 K/UL — SIGNIFICANT CHANGE UP (ref 3.8–10.5)

## 2025-05-10 PROCEDURE — 99239 HOSP IP/OBS DSCHRG MGMT >30: CPT

## 2025-05-10 PROCEDURE — 93010 ELECTROCARDIOGRAM REPORT: CPT

## 2025-05-10 RX ORDER — METOPROLOL SUCCINATE 50 MG/1
1 TABLET, EXTENDED RELEASE ORAL
Refills: 0 | DISCHARGE

## 2025-05-10 RX ORDER — ACETAMINOPHEN 500 MG/5ML
2 LIQUID (ML) ORAL
Qty: 80 | Refills: 0
Start: 2025-05-10 | End: 2025-05-19

## 2025-05-10 RX ORDER — BUMETANIDE 1 MG/1
1 TABLET ORAL
Qty: 30 | Refills: 0
Start: 2025-05-10 | End: 2025-06-08

## 2025-05-10 RX ORDER — METOPROLOL SUCCINATE 50 MG/1
1 TABLET, EXTENDED RELEASE ORAL
Qty: 60 | Refills: 0
Start: 2025-05-10 | End: 2025-06-08

## 2025-05-10 RX ORDER — FERROUS SULFATE 137(45) MG
1 TABLET, EXTENDED RELEASE ORAL
Qty: 30 | Refills: 0
Start: 2025-05-10 | End: 2025-06-08

## 2025-05-10 RX ORDER — ZINC SULFATE 50(220)MG
1 CAPSULE ORAL
Qty: 30 | Refills: 0
Start: 2025-05-10 | End: 2025-06-08

## 2025-05-10 RX ORDER — MUPIROCIN CALCIUM 20 MG/G
1 CREAM TOPICAL
Qty: 30 | Refills: 0
Start: 2025-05-10 | End: 2025-05-23

## 2025-05-10 RX ORDER — METOPROLOL SUCCINATE 50 MG/1
50 TABLET, EXTENDED RELEASE ORAL
Refills: 0 | Status: DISCONTINUED | OUTPATIENT
Start: 2025-05-10 | End: 2025-05-10

## 2025-05-10 RX ORDER — SACUBITRIL AND VALSARTAN 6; 6 MG/1; MG/1
1 PELLET ORAL
Qty: 60 | Refills: 0
Start: 2025-05-10 | End: 2025-06-08

## 2025-05-10 RX ORDER — DAPAGLIFLOZIN 5 MG/1
1 TABLET, FILM COATED ORAL
Qty: 30 | Refills: 0
Start: 2025-05-10 | End: 2025-06-08

## 2025-05-10 RX ORDER — FUROSEMIDE 10 MG/ML
1 INJECTION INTRAMUSCULAR; INTRAVENOUS
Refills: 0 | DISCHARGE

## 2025-05-10 RX ADMIN — BUMETANIDE 1 MILLIGRAM(S): 1 TABLET ORAL at 09:40

## 2025-05-10 RX ADMIN — Medication 1000 MILLIGRAM(S): at 00:42

## 2025-05-10 RX ADMIN — DAPAGLIFLOZIN 10 MILLIGRAM(S): 5 TABLET, FILM COATED ORAL at 09:41

## 2025-05-10 RX ADMIN — MUPIROCIN CALCIUM 1 APPLICATION(S): 20 CREAM TOPICAL at 09:40

## 2025-05-10 RX ADMIN — SACUBITRIL AND VALSARTAN 1 TABLET(S): 6; 6 PELLET ORAL at 09:41

## 2025-05-10 RX ADMIN — Medication 500 MILLIGRAM(S): at 09:40

## 2025-05-10 RX ADMIN — METOPROLOL SUCCINATE 75 MILLIGRAM(S): 50 TABLET, EXTENDED RELEASE ORAL at 09:43

## 2025-05-10 RX ADMIN — Medication 325 MILLIGRAM(S): at 09:41

## 2025-05-15 DIAGNOSIS — J96.01 ACUTE RESPIRATORY FAILURE WITH HYPOXIA: ICD-10-CM

## 2025-05-15 DIAGNOSIS — I95.9 HYPOTENSION, UNSPECIFIED: ICD-10-CM

## 2025-05-15 DIAGNOSIS — Z80.8 FAMILY HISTORY OF MALIGNANT NEOPLASM OF OTHER ORGANS OR SYSTEMS: ICD-10-CM

## 2025-05-15 DIAGNOSIS — C79.70 SECONDARY MALIGNANT NEOPLASM OF UNSPECIFIED ADRENAL GLAND: ICD-10-CM

## 2025-05-15 DIAGNOSIS — I43 CARDIOMYOPATHY IN DISEASES CLASSIFIED ELSEWHERE: ICD-10-CM

## 2025-05-15 DIAGNOSIS — Z92.25 PERSONAL HISTORY OF IMMUNOSUPPRESSION THERAPY: ICD-10-CM

## 2025-05-15 DIAGNOSIS — Z80.1 FAMILY HISTORY OF MALIGNANT NEOPLASM OF TRACHEA, BRONCHUS AND LUNG: ICD-10-CM

## 2025-05-15 DIAGNOSIS — Z79.52 LONG TERM (CURRENT) USE OF SYSTEMIC STEROIDS: ICD-10-CM

## 2025-05-15 DIAGNOSIS — Z91.018 ALLERGY TO OTHER FOODS: ICD-10-CM

## 2025-05-15 DIAGNOSIS — E87.6 HYPOKALEMIA: ICD-10-CM

## 2025-05-15 DIAGNOSIS — I06.1 RHEUMATIC AORTIC INSUFFICIENCY: ICD-10-CM
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
## 2025-05-15 DIAGNOSIS — M50.20 OTHER CERVICAL DISC DISPLACEMENT, UNSPECIFIED CERVICAL REGION: ICD-10-CM

## 2025-05-15 DIAGNOSIS — D50.9 IRON DEFICIENCY ANEMIA, UNSPECIFIED: ICD-10-CM

## 2025-05-15 DIAGNOSIS — Z79.899 OTHER LONG TERM (CURRENT) DRUG THERAPY: ICD-10-CM

## 2025-05-15 DIAGNOSIS — Z86.0100 PERSONAL HISTORY OF COLON POLYPS, UNSPECIFIED: ICD-10-CM
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
## 2025-05-15 DIAGNOSIS — C79.31 SECONDARY MALIGNANT NEOPLASM OF BRAIN: ICD-10-CM

## 2025-05-15 DIAGNOSIS — Z88.1 ALLERGY STATUS TO OTHER ANTIBIOTIC AGENTS: ICD-10-CM

## 2025-05-15 DIAGNOSIS — I50.43 ACUTE ON CHRONIC COMBINED SYSTOLIC (CONGESTIVE) AND DIASTOLIC (CONGESTIVE) HEART FAILURE: ICD-10-CM

## 2025-05-15 DIAGNOSIS — E44.0 MODERATE PROTEIN-CALORIE MALNUTRITION: ICD-10-CM

## 2025-05-15 DIAGNOSIS — G47.00 INSOMNIA, UNSPECIFIED: ICD-10-CM

## 2025-05-15 DIAGNOSIS — S81.802A UNSPECIFIED OPEN WOUND, LEFT LOWER LEG, INITIAL ENCOUNTER: ICD-10-CM

## 2025-05-15 DIAGNOSIS — Z79.01 LONG TERM (CURRENT) USE OF ANTICOAGULANTS: ICD-10-CM

## 2025-05-15 DIAGNOSIS — I48.0 PAROXYSMAL ATRIAL FIBRILLATION: ICD-10-CM

## 2025-05-15 DIAGNOSIS — M17.11 UNILATERAL PRIMARY OSTEOARTHRITIS, RIGHT KNEE: ICD-10-CM

## 2025-05-15 DIAGNOSIS — I48.92 UNSPECIFIED ATRIAL FLUTTER: ICD-10-CM

## 2025-05-15 DIAGNOSIS — E78.5 HYPERLIPIDEMIA, UNSPECIFIED: ICD-10-CM

## 2025-05-15 DIAGNOSIS — I11.0 HYPERTENSIVE HEART DISEASE WITH HEART FAILURE: ICD-10-CM

## 2025-05-15 DIAGNOSIS — E66.9 OBESITY, UNSPECIFIED: ICD-10-CM

## 2025-05-21 ENCOUNTER — APPOINTMENT (OUTPATIENT)
Dept: ELECTROPHYSIOLOGY | Facility: CLINIC | Age: 75
End: 2025-05-21

## 2025-05-27 ENCOUNTER — APPOINTMENT (OUTPATIENT)
Dept: ELECTROPHYSIOLOGY | Facility: CLINIC | Age: 75
End: 2025-05-27

## 2025-06-04 LAB
CULTURE RESULTS: SIGNIFICANT CHANGE UP
SPECIMEN SOURCE: SIGNIFICANT CHANGE UP

## 2025-07-15 ENCOUNTER — APPOINTMENT (OUTPATIENT)
Dept: INTERNAL MEDICINE | Facility: CLINIC | Age: 75
End: 2025-07-15
Payer: MEDICARE

## 2025-07-15 VITALS
BODY MASS INDEX: 34.48 KG/M2 | DIASTOLIC BLOOD PRESSURE: 58 MMHG | OXYGEN SATURATION: 97 % | HEIGHT: 77 IN | SYSTOLIC BLOOD PRESSURE: 127 MMHG | HEART RATE: 81 BPM | TEMPERATURE: 98.5 F | WEIGHT: 292 LBS

## 2025-07-15 PROCEDURE — 99214 OFFICE O/P EST MOD 30 MIN: CPT | Mod: 25

## 2025-09-09 ENCOUNTER — APPOINTMENT (OUTPATIENT)
Dept: INTERNAL MEDICINE | Facility: CLINIC | Age: 75
End: 2025-09-09
Payer: MEDICARE

## 2025-09-09 VITALS
TEMPERATURE: 97.9 F | BODY MASS INDEX: 35.89 KG/M2 | WEIGHT: 304 LBS | OXYGEN SATURATION: 98 % | HEIGHT: 77 IN | HEART RATE: 83 BPM | DIASTOLIC BLOOD PRESSURE: 65 MMHG | SYSTOLIC BLOOD PRESSURE: 124 MMHG

## 2025-09-09 DIAGNOSIS — C43.9 MALIGNANT MELANOMA OF SKIN, UNSPECIFIED: ICD-10-CM

## 2025-09-09 DIAGNOSIS — Z86.79 PERSONAL HISTORY OF OTHER DISEASES OF THE CIRCULATORY SYSTEM: ICD-10-CM

## 2025-09-09 DIAGNOSIS — Z12.11 ENCOUNTER FOR SCREENING FOR MALIGNANT NEOPLASM OF COLON: ICD-10-CM

## 2025-09-09 DIAGNOSIS — Z00.00 ENCOUNTER FOR GENERAL ADULT MEDICAL EXAMINATION W/OUT ABNORMAL FINDINGS: ICD-10-CM

## 2025-09-09 DIAGNOSIS — R31.9 HEMATURIA, UNSPECIFIED: ICD-10-CM

## 2025-09-09 DIAGNOSIS — Z23 ENCOUNTER FOR IMMUNIZATION: ICD-10-CM

## 2025-09-09 DIAGNOSIS — I71.20 THORACIC AORTIC ANEURYSM, WITHOUT RUPTURE, UNSPECIFIED: ICD-10-CM

## 2025-09-09 DIAGNOSIS — E78.5 HYPERLIPIDEMIA, UNSPECIFIED: ICD-10-CM

## 2025-09-09 DIAGNOSIS — E66.9 OBESITY, UNSPECIFIED: ICD-10-CM

## 2025-09-09 DIAGNOSIS — J90 PLEURAL EFFUSION, NOT ELSEWHERE CLASSIFIED: ICD-10-CM

## 2025-09-09 LAB
BILIRUB UR QL STRIP: NEGATIVE
CLARITY UR: CLEAR
COLLECTION METHOD: NORMAL
GLUCOSE UR-MCNC: 500
HCG UR QL: 1 EU/DL
HGB UR QL STRIP.AUTO: NORMAL
KETONES UR-MCNC: NEGATIVE
LEUKOCYTE ESTERASE UR QL STRIP: NEGATIVE
NITRITE UR QL STRIP: NEGATIVE
PH UR STRIP: 6.5
PROT UR STRIP-MCNC: NORMAL
SP GR UR STRIP: 1.02

## 2025-09-09 PROCEDURE — 81003 URINALYSIS AUTO W/O SCOPE: CPT | Mod: QW

## 2025-09-09 PROCEDURE — 36415 COLL VENOUS BLD VENIPUNCTURE: CPT

## 2025-09-09 PROCEDURE — G0008: CPT

## 2025-09-09 PROCEDURE — G0447 BEHAVIOR COUNSEL OBESITY 15M: CPT

## 2025-09-09 PROCEDURE — G0439: CPT

## 2025-09-09 PROCEDURE — 90662 IIV NO PRSV INCREASED AG IM: CPT

## 2025-09-09 RX ORDER — POTASSIUM CHLORIDE 10 MEQ
10 CAPSULE, EXTENDED RELEASE ORAL DAILY
Refills: 0 | Status: ACTIVE | COMMUNITY

## 2025-09-09 RX ORDER — DAPAGLIFLOZIN 10 MG/1
10 TABLET, FILM COATED ORAL DAILY
Refills: 0 | Status: ACTIVE | COMMUNITY

## 2025-09-10 DIAGNOSIS — Z79.899 OTHER LONG TERM (CURRENT) DRUG THERAPY: ICD-10-CM

## 2025-09-11 PROBLEM — C43.9 MELANOMA: Status: ACTIVE | Noted: 2025-09-09

## 2025-09-11 PROBLEM — E78.5 HLD (HYPERLIPIDEMIA): Status: ACTIVE | Noted: 2025-07-17

## 2025-09-11 LAB
25(OH)D3 SERPL-MCNC: 37.4 NG/ML
ALBUMIN SERPL ELPH-MCNC: 4 G/DL
ALP BLD-CCNC: 65 U/L
ALT SERPL-CCNC: 14 U/L
ANION GAP SERPL CALC-SCNC: 13 MMOL/L
APPEARANCE: CLEAR
AST SERPL-CCNC: 27 U/L
BASOPHILS # BLD AUTO: 0.08 K/UL
BASOPHILS NFR BLD AUTO: 0.7 %
BILIRUB SERPL-MCNC: 0.6 MG/DL
BILIRUBIN URINE: NEGATIVE
BLOOD URINE: NEGATIVE
BUN SERPL-MCNC: 23 MG/DL
CALCIUM SERPL-MCNC: 9.2 MG/DL
CHLORIDE SERPL-SCNC: 104 MMOL/L
CHOLEST SERPL-MCNC: 125 MG/DL
CO2 SERPL-SCNC: 21 MMOL/L
COLOR: YELLOW
CREAT SERPL-MCNC: 0.97 MG/DL
DRE ABNORMAL+AA+ARIS: 72 %
DRE ABNORMAL+AFRICAN ANCESTRY: 55 %
DRE ABNORMAL+ARIS: 66 %
DRE ABNORMAL+FAM HIST+AA+ARIS: 79 %
DRE ABNORMAL+FAM HIST+AA: 65 %
DRE ABNORMAL+FAM HIST+ARIS: 76 %
DRE ABNORMAL+FAM HIST: 60 %
DRE ABNORMAL: 48 %
DRE NORMAL+AA+ARIS: 53 %
DRE NORMAL+AFRICAN ANCESTRY: 35 %
DRE NORMAL+ARIS: 46 %
DRE NORMAL+FAM HIST+AA+ARIS: 63 %
DRE NORMAL+FAM HIST+AA: 45 %
DRE NORMAL+FAM HIST+ARIS: 58 %
DRE NORMAL+FAM HIST: 39 %
DRE NORMAL: 28 %
EGFRCR SERPLBLD CKD-EPI 2021: 81 ML/MIN/1.73M2
EOSINOPHIL # BLD AUTO: 0.38 K/UL
EOSINOPHIL NFR BLD AUTO: 3.6 %
ESTIMATED AVERAGE GLUCOSE: 114 MG/DL
GLUCOSE QUALITATIVE U: 500 MG/DL
GLUCOSE SERPL-MCNC: 90 MG/DL
HBA1C MFR BLD HPLC: 5.6 %
HCT VFR BLD CALC: 44.4 %
HDLC SERPL-MCNC: 32 MG/DL
HGB BLD-MCNC: 14.4 G/DL
IMM GRANULOCYTES NFR BLD AUTO: 0.3 %
KETONES URINE: NEGATIVE MG/DL
LDLC SERPL-MCNC: 68 MG/DL
LEUKOCYTE ESTERASE URINE: NEGATIVE
LYMPHOCYTES # BLD AUTO: 1.79 K/UL
LYMPHOCYTES NFR BLD AUTO: 16.7 %
MAN DIFF?: NORMAL
MCHC RBC-ENTMCNC: 28.6 PG
MCHC RBC-ENTMCNC: 32.4 G/DL
MCV RBC AUTO: 88.3 FL
MONOCYTES # BLD AUTO: 1.03 K/UL
MONOCYTES NFR BLD AUTO: 9.6 %
NEUTROPHILS # BLD AUTO: 7.38 K/UL
NEUTROPHILS NFR BLD AUTO: 69.1 %
NITRITE URINE: NEGATIVE
NONHDLC SERPL-MCNC: 93 MG/DL
PH URINE: 6
PLATELET # BLD AUTO: 241 K/UL
POTASSIUM SERPL-SCNC: 5 MMOL/L
PROT SERPL-MCNC: 7.3 G/DL
PROTEIN URINE: NEGATIVE MG/DL
PSA RISK STRATIFICATION INTERP: NORMAL
PSA RISK STRATIFICATION: NORMAL
PSA SERPL-MCNC: 3.9 NG/ML
RBC # BLD: 5.03 M/UL
RBC # FLD: 15.5 %
SODIUM SERPL-SCNC: 138 MMOL/L
SPECIFIC GRAVITY URINE: 1.02
TRIGL SERPL-MCNC: 138 MG/DL
TSH SERPL-ACNC: 2.37 UIU/ML
UROBILINOGEN URINE: 1 MG/DL
WBC # FLD AUTO: 10.69 K/UL

## 2025-09-11 RX ORDER — POTASSIUM CHLORIDE 750 MG/1
10 CAPSULE, EXTENDED RELEASE ORAL
Qty: 90 | Refills: 3 | Status: ACTIVE | COMMUNITY
Start: 2025-09-10 | End: 1900-01-01